# Patient Record
Sex: FEMALE | URBAN - METROPOLITAN AREA
[De-identification: names, ages, dates, MRNs, and addresses within clinical notes are randomized per-mention and may not be internally consistent; named-entity substitution may affect disease eponyms.]

---

## 2022-10-06 ENCOUNTER — INITIAL CONSULT (OUTPATIENT)
Dept: SURGERY | Age: 82
End: 2022-10-06
Payer: MEDICARE

## 2022-10-06 VITALS
SYSTOLIC BLOOD PRESSURE: 119 MMHG | DIASTOLIC BLOOD PRESSURE: 63 MMHG | HEIGHT: 62 IN | WEIGHT: 154.2 LBS | BODY MASS INDEX: 28.37 KG/M2 | HEART RATE: 60 BPM

## 2022-10-06 DIAGNOSIS — K44.9 HIATAL HERNIA: Primary | ICD-10-CM

## 2022-10-06 PROCEDURE — 1036F TOBACCO NON-USER: CPT | Performed by: SURGERY

## 2022-10-06 PROCEDURE — 99203 OFFICE O/P NEW LOW 30 MIN: CPT | Performed by: SURGERY

## 2022-10-06 PROCEDURE — G8400 PT W/DXA NO RESULTS DOC: HCPCS | Performed by: SURGERY

## 2022-10-06 PROCEDURE — 1090F PRES/ABSN URINE INCON ASSESS: CPT | Performed by: SURGERY

## 2022-10-06 PROCEDURE — G8427 DOCREV CUR MEDS BY ELIG CLIN: HCPCS | Performed by: SURGERY

## 2022-10-06 PROCEDURE — G8484 FLU IMMUNIZE NO ADMIN: HCPCS | Performed by: SURGERY

## 2022-10-06 PROCEDURE — G8419 CALC BMI OUT NRM PARAM NOF/U: HCPCS | Performed by: SURGERY

## 2022-10-06 PROCEDURE — 1123F ACP DISCUSS/DSCN MKR DOCD: CPT | Performed by: SURGERY

## 2022-10-06 RX ORDER — CALCIUM CARBONATE 500(1250)
500 TABLET ORAL DAILY
COMMUNITY

## 2022-10-06 RX ORDER — FLUTICASONE PROPIONATE 50 MCG
SPRAY, SUSPENSION (ML) NASAL
COMMUNITY
Start: 2022-09-15

## 2022-10-06 RX ORDER — SALMON OIL/OMEGA-3 FATTY ACIDS 1000-200MG
CAPSULE ORAL
COMMUNITY

## 2022-10-06 RX ORDER — ESOMEPRAZOLE MAGNESIUM 40 MG/1
CAPSULE, DELAYED RELEASE ORAL
COMMUNITY
Start: 2022-09-26

## 2022-10-06 RX ORDER — METOPROLOL SUCCINATE 50 MG/1
TABLET, EXTENDED RELEASE ORAL
COMMUNITY
Start: 2022-09-26

## 2022-10-06 RX ORDER — DILTIAZEM HYDROCHLORIDE 120 MG/1
CAPSULE, COATED, EXTENDED RELEASE ORAL
COMMUNITY
Start: 2022-09-07

## 2022-10-06 RX ORDER — ALPRAZOLAM 0.25 MG/1
TABLET ORAL
COMMUNITY
Start: 2022-09-26

## 2022-10-06 RX ORDER — MELOXICAM 7.5 MG/1
TABLET ORAL
COMMUNITY
Start: 2022-07-06

## 2022-10-06 RX ORDER — OLOPATADINE HYDROCHLORIDE 1 MG/ML
1 SOLUTION/ DROPS OPHTHALMIC 2 TIMES DAILY
COMMUNITY

## 2022-10-06 RX ORDER — DENOSUMAB 60 MG/ML
INJECTION SUBCUTANEOUS
COMMUNITY
Start: 2022-10-03

## 2022-10-06 NOTE — PROGRESS NOTES
New Patient Via Jostin Healy MD    [address], [address] [facility]  [place], [facility]  [phone]    Radha Baptist   YOB: 1940    Date of Visit:  10/6/2022    Fanta Novoa MD    Chief Complaint: Nausea and vomiting    HPI: Patient presents for evaluation of a hiatal hernia. She states about a month ago she developed nausea and vomiting. She was seen in the emergency room and diagnosed with a urinary tract infection. On her chest x-ray they noted she had a hiatal hernia. She states that she does take Nexium which seems to control her symptoms fairly well. She has occasional reflux and has had to have an esophageal dilation in the past.  The reflux is exacerbated by spicy foods.   She has not had any recent imaging or endoscopy to evaluate this    Allergies   Allergen Reactions    Sulfa Antibiotics      Outpatient Medications Marked as Taking for the 10/6/22 encounter (Initial consult) with Gurmeet Delong MD   Medication Sig Dispense Refill    fluticasone (FLONASE) 50 MCG/ACT nasal spray       ALPRAZolam (XANAX) 0.25 MG tablet       metoprolol succinate (TOPROL XL) 50 MG extended release tablet       esomeprazole (NEXIUM) 40 MG delayed release capsule       meloxicam (MOBIC) 7.5 MG tablet       dilTIAZem (CARDIZEM CD) 120 MG extended release capsule       PROLIA 60 MG/ML SOSY SC injection       Polyethyl Glycol-Propyl Glycol (SYSTANE OP) Apply to eye      olopatadine (PATANOL) 0.1 % ophthalmic solution 1 drop 2 times daily      Calcium Carb-Cholecalciferol (CALCIUM 600+D3 PO) Take by mouth      calcium carbonate (OSCAL) 500 MG TABS tablet Take 500 mg by mouth daily      VITAMIN E PO Take by mouth      Ferrous Gluconate (IRON 27 PO) Take by mouth      Omega-3 Fatty Acids (SALMON OIL-1000) 200 MG CAPS Take by mouth      ACETAMINOPHEN PO Take by mouth      Multiple Vitamins-Minerals (PRESERVISION AREDS PO) Take by mouth         No past medical history on file. Past Surgical History:   Procedure Laterality Date    BACK SURGERY      CATARACT EXTRACTION, BILATERAL      COLON SURGERY      PACEMAKER INSERTION       No family history on file. Social History     Socioeconomic History    Marital status: Unknown     Spouse name: Not on file    Number of children: Not on file    Years of education: Not on file    Highest education level: Not on file   Occupational History    Not on file   Tobacco Use    Smoking status: Never    Smokeless tobacco: Never   Vaping Use    Vaping Use: Never used   Substance and Sexual Activity    Alcohol use: Never    Drug use: Never    Sexual activity: Not on file   Other Topics Concern    Not on file   Social History Narrative    Not on file     Social Determinants of Health     Financial Resource Strain: Not on file   Food Insecurity: Not on file   Transportation Needs: Not on file   Physical Activity: Not on file   Stress: Not on file   Social Connections: Not on file   Intimate Partner Violence: Not on file   Housing Stability: Not on file          Vitals:    10/06/22 0941   BP: 119/63   Site: Left Wrist   Position: Sitting   Cuff Size: Medium Adult   Pulse: 60   Weight: 154 lb 3.2 oz (69.9 kg)   Height: 5' 2\" (1.575 m)     Body mass index is 28.2 kg/m². Wt Readings from Last 3 Encounters:   10/06/22 154 lb 3.2 oz (69.9 kg)     BP Readings from Last 3 Encounters:   10/06/22 119/63        REVIEW OF SYSTEMS:  CONSTITUTIONAL:  negative  HEENT:  Negative  RESPIRATORY:  negative  CARDIOVASCULAR:  negative  GASTROINTESTINAL:  positive for nausea, vomiting, and reflux  GENITOURINARY:  negative  HEMATOLOGIC/LYMPHATIC:  negative  ENDOCRINE:  Negative  NEUROLOGICAL:  Negative  * All other ROS reviewed and negative.      PE:  Constitutional:  Well developed, well nourished, no acute distress, non-toxic appearance   Eyes:  PERRL, conjunctiva normal   HENT:  Atraumatic, external ears normal, nose normal. Neck- normal range of motion, no tenderness, supple   Respiratory:  No respiratory distress, normal breath sounds, no rales, no wheezing   Cardiovascular:  Normal rate, normal rhythm  GI: Bowel sounds positive, soft, nontender  :  No costovertebral angle tenderness   Integument:  Well hydrated, no rash   Lymphatic:  No lymphadenopathy noted   Neurologic:  Alert & oriented x 3, no focal deficits noted   Psychiatric:  Speech and behavior appropriate       DATA:  Radiology Review: Chest x-ray shows a density in the retrocardiac area consistent with a possible hiatal hernia      Assessment:  1. Hiatal hernia        Plan: I reinforced to her that nausea and vomiting are not usually prominent symptoms of hiatal hernia, but her reflux symptoms can be. We talked about some management options and in addition I ordered an upper GI for more definitive evaluation. If this shows a significant hiatal hernia with reflux she will need additional work-up prior to considering surgical intervention.   I will give her a call with the results

## 2022-11-04 ENCOUNTER — TELEPHONE (OUTPATIENT)
Dept: SURGERY | Age: 82
End: 2022-11-04

## 2022-11-04 NOTE — TELEPHONE ENCOUNTER
Patient was seen 10/6/2022 and Dr. Dania Weston was going to order a Upper GI and the patient wants to do this at [facility]. Because its closer for her & the family. The orders have not been received by Jessica mendoza, and the patient's daughter in law stated that her sx were flaring up again. Can you please refax this order or call central scheduling? Then call the daughter in law back.

## 2022-11-05 ENCOUNTER — APPOINTMENT (OUTPATIENT)
Dept: CT IMAGING | Age: 82
DRG: 690 | End: 2022-11-05
Payer: MEDICARE

## 2022-11-05 ENCOUNTER — HOSPITAL ENCOUNTER (INPATIENT)
Age: 82
LOS: 3 days | Discharge: HOME | DRG: 690 | End: 2022-11-08
Attending: STUDENT IN AN ORGANIZED HEALTH CARE EDUCATION/TRAINING PROGRAM | Admitting: INTERNAL MEDICINE
Payer: MEDICARE

## 2022-11-05 DIAGNOSIS — R11.2 NAUSEA AND VOMITING, UNSPECIFIED VOMITING TYPE: ICD-10-CM

## 2022-11-05 DIAGNOSIS — N12 PYELONEPHRITIS: ICD-10-CM

## 2022-11-05 DIAGNOSIS — N17.9 AKI (ACUTE KIDNEY INJURY) (HCC): Primary | ICD-10-CM

## 2022-11-05 DIAGNOSIS — R65.10 SIRS (SYSTEMIC INFLAMMATORY RESPONSE SYNDROME) (HCC): ICD-10-CM

## 2022-11-05 PROBLEM — N39.0 UTI (URINARY TRACT INFECTION): Status: ACTIVE | Noted: 2022-11-05

## 2022-11-05 LAB
A/G RATIO: 0.8 (ref 1.1–2.2)
ALBUMIN SERPL-MCNC: 3 G/DL (ref 3.4–5)
ALP BLD-CCNC: 114 U/L (ref 40–129)
ALT SERPL-CCNC: 17 U/L (ref 10–40)
ANION GAP SERPL CALCULATED.3IONS-SCNC: 13 MMOL/L (ref 3–16)
AST SERPL-CCNC: 15 U/L (ref 15–37)
BACTERIA: ABNORMAL /HPF
BASOPHILS ABSOLUTE: 0 K/UL (ref 0–0.2)
BASOPHILS RELATIVE PERCENT: 0.2 %
BILIRUB SERPL-MCNC: 0.4 MG/DL (ref 0–1)
BILIRUBIN URINE: ABNORMAL
BLOOD, URINE: ABNORMAL
BUN BLDV-MCNC: 31 MG/DL (ref 7–20)
CALCIUM SERPL-MCNC: 8.8 MG/DL (ref 8.3–10.6)
CHLORIDE BLD-SCNC: 95 MMOL/L (ref 99–110)
CLARITY: ABNORMAL
CO2: 20 MMOL/L (ref 21–32)
COLOR: YELLOW
CREAT SERPL-MCNC: 1.5 MG/DL (ref 0.6–1.2)
EOSINOPHILS ABSOLUTE: 0 K/UL (ref 0–0.6)
EOSINOPHILS RELATIVE PERCENT: 0.1 %
EPITHELIAL CELLS, UA: ABNORMAL /HPF (ref 0–5)
GFR SERPL CREATININE-BSD FRML MDRD: 35 ML/MIN/{1.73_M2}
GLUCOSE BLD-MCNC: 104 MG/DL (ref 70–99)
GLUCOSE URINE: NEGATIVE MG/DL
HCT VFR BLD CALC: 30.8 % (ref 36–48)
HEMOGLOBIN: 10.3 G/DL (ref 12–16)
INFLUENZA A: NOT DETECTED
INFLUENZA B: NOT DETECTED
KETONES, URINE: 40 MG/DL
LACTIC ACID: 0.9 MMOL/L (ref 0.4–2)
LACTIC ACID: 0.9 MMOL/L (ref 0.4–2)
LEUKOCYTE ESTERASE, URINE: ABNORMAL
LIPASE: 27 U/L (ref 13–60)
LYMPHOCYTES ABSOLUTE: 0.9 K/UL (ref 1–5.1)
LYMPHOCYTES RELATIVE PERCENT: 5.5 %
MCH RBC QN AUTO: 31.4 PG (ref 26–34)
MCHC RBC AUTO-ENTMCNC: 33.4 G/DL (ref 31–36)
MCV RBC AUTO: 94.1 FL (ref 80–100)
MICROSCOPIC EXAMINATION: YES
MONOCYTES ABSOLUTE: 1.3 K/UL (ref 0–1.3)
MONOCYTES RELATIVE PERCENT: 8 %
NEUTROPHILS ABSOLUTE: 14 K/UL (ref 1.7–7.7)
NEUTROPHILS RELATIVE PERCENT: 86.2 %
NITRITE, URINE: NEGATIVE
PDW BLD-RTO: 13.5 % (ref 12.4–15.4)
PH UA: 5.5 (ref 5–8)
PLATELET # BLD: 259 K/UL (ref 135–450)
PMV BLD AUTO: 6.9 FL (ref 5–10.5)
POTASSIUM REFLEX MAGNESIUM: 4 MMOL/L (ref 3.5–5.1)
PROTEIN UA: 100 MG/DL
RBC # BLD: 3.27 M/UL (ref 4–5.2)
RBC UA: ABNORMAL /HPF (ref 0–4)
RENAL EPITHELIAL, UA: ABNORMAL /HPF (ref 0–1)
SARS-COV-2 RNA, RT PCR: NOT DETECTED
SODIUM BLD-SCNC: 128 MMOL/L (ref 136–145)
SPECIFIC GRAVITY UA: >=1.03 (ref 1–1.03)
TOTAL PROTEIN: 6.8 G/DL (ref 6.4–8.2)
TROPONIN: <0.01 NG/ML
URINE REFLEX TO CULTURE: YES
URINE TYPE: ABNORMAL
UROBILINOGEN, URINE: 0.2 E.U./DL
WBC # BLD: 16.2 K/UL (ref 4–11)
WBC UA: ABNORMAL /HPF (ref 0–5)

## 2022-11-05 PROCEDURE — 84484 ASSAY OF TROPONIN QUANT: CPT

## 2022-11-05 PROCEDURE — 87186 SC STD MICRODIL/AGAR DIL: CPT

## 2022-11-05 PROCEDURE — 6360000002 HC RX W HCPCS: Performed by: INTERNAL MEDICINE

## 2022-11-05 PROCEDURE — 96365 THER/PROPH/DIAG IV INF INIT: CPT

## 2022-11-05 PROCEDURE — 99285 EMERGENCY DEPT VISIT HI MDM: CPT

## 2022-11-05 PROCEDURE — 83690 ASSAY OF LIPASE: CPT

## 2022-11-05 PROCEDURE — 2580000003 HC RX 258: Performed by: PHYSICIAN ASSISTANT

## 2022-11-05 PROCEDURE — 96375 TX/PRO/DX INJ NEW DRUG ADDON: CPT

## 2022-11-05 PROCEDURE — 74176 CT ABD & PELVIS W/O CONTRAST: CPT

## 2022-11-05 PROCEDURE — 87086 URINE CULTURE/COLONY COUNT: CPT

## 2022-11-05 PROCEDURE — 2500000003 HC RX 250 WO HCPCS: Performed by: INTERNAL MEDICINE

## 2022-11-05 PROCEDURE — 87088 URINE BACTERIA CULTURE: CPT

## 2022-11-05 PROCEDURE — 1200000000 HC SEMI PRIVATE

## 2022-11-05 PROCEDURE — 93005 ELECTROCARDIOGRAM TRACING: CPT | Performed by: PHYSICIAN ASSISTANT

## 2022-11-05 PROCEDURE — 81001 URINALYSIS AUTO W/SCOPE: CPT

## 2022-11-05 PROCEDURE — 87636 SARSCOV2 & INF A&B AMP PRB: CPT

## 2022-11-05 PROCEDURE — 6360000002 HC RX W HCPCS: Performed by: STUDENT IN AN ORGANIZED HEALTH CARE EDUCATION/TRAINING PROGRAM

## 2022-11-05 PROCEDURE — 96361 HYDRATE IV INFUSION ADD-ON: CPT

## 2022-11-05 PROCEDURE — 6370000000 HC RX 637 (ALT 250 FOR IP): Performed by: INTERNAL MEDICINE

## 2022-11-05 PROCEDURE — 85025 COMPLETE CBC W/AUTO DIFF WBC: CPT

## 2022-11-05 PROCEDURE — 80053 COMPREHEN METABOLIC PANEL: CPT

## 2022-11-05 PROCEDURE — 6360000002 HC RX W HCPCS: Performed by: PHYSICIAN ASSISTANT

## 2022-11-05 PROCEDURE — 83605 ASSAY OF LACTIC ACID: CPT

## 2022-11-05 PROCEDURE — 87040 BLOOD CULTURE FOR BACTERIA: CPT

## 2022-11-05 PROCEDURE — 36415 COLL VENOUS BLD VENIPUNCTURE: CPT

## 2022-11-05 RX ORDER — KETOTIFEN FUMARATE 0.35 MG/ML
1 SOLUTION/ DROPS OPHTHALMIC 2 TIMES DAILY
Status: DISCONTINUED | OUTPATIENT
Start: 2022-11-05 | End: 2022-11-08 | Stop reason: HOSPADM

## 2022-11-05 RX ORDER — POLYETHYLENE GLYCOL 3350 17 G/17G
17 POWDER, FOR SOLUTION ORAL DAILY PRN
Status: DISCONTINUED | OUTPATIENT
Start: 2022-11-05 | End: 2022-11-08 | Stop reason: HOSPADM

## 2022-11-05 RX ORDER — 0.9 % SODIUM CHLORIDE 0.9 %
1000 INTRAVENOUS SOLUTION INTRAVENOUS ONCE
Status: COMPLETED | OUTPATIENT
Start: 2022-11-05 | End: 2022-11-05

## 2022-11-05 RX ORDER — SODIUM CHLORIDE 0.9 % (FLUSH) 0.9 %
5-40 SYRINGE (ML) INJECTION PRN
Status: DISCONTINUED | OUTPATIENT
Start: 2022-11-05 | End: 2022-11-08 | Stop reason: HOSPADM

## 2022-11-05 RX ORDER — DILTIAZEM HYDROCHLORIDE 120 MG/1
120 CAPSULE, EXTENDED RELEASE ORAL DAILY
Status: DISCONTINUED | OUTPATIENT
Start: 2022-11-05 | End: 2022-11-07

## 2022-11-05 RX ORDER — METOPROLOL SUCCINATE 50 MG/1
50 TABLET, EXTENDED RELEASE ORAL DAILY
Status: DISCONTINUED | OUTPATIENT
Start: 2022-11-05 | End: 2022-11-07

## 2022-11-05 RX ORDER — ENOXAPARIN SODIUM 100 MG/ML
30 INJECTION SUBCUTANEOUS DAILY
Status: DISCONTINUED | OUTPATIENT
Start: 2022-11-05 | End: 2022-11-08 | Stop reason: HOSPADM

## 2022-11-05 RX ORDER — SODIUM CHLORIDE 9 MG/ML
INJECTION, SOLUTION INTRAVENOUS PRN
Status: DISCONTINUED | OUTPATIENT
Start: 2022-11-05 | End: 2022-11-08 | Stop reason: HOSPADM

## 2022-11-05 RX ORDER — ONDANSETRON 4 MG/1
4 TABLET, ORALLY DISINTEGRATING ORAL EVERY 8 HOURS PRN
Status: DISCONTINUED | OUTPATIENT
Start: 2022-11-05 | End: 2022-11-08 | Stop reason: HOSPADM

## 2022-11-05 RX ORDER — MORPHINE SULFATE 2 MG/ML
2 INJECTION, SOLUTION INTRAMUSCULAR; INTRAVENOUS EVERY 4 HOURS PRN
Status: DISCONTINUED | OUTPATIENT
Start: 2022-11-05 | End: 2022-11-08 | Stop reason: HOSPADM

## 2022-11-05 RX ORDER — ONDANSETRON 2 MG/ML
4 INJECTION INTRAMUSCULAR; INTRAVENOUS EVERY 6 HOURS PRN
Status: DISCONTINUED | OUTPATIENT
Start: 2022-11-05 | End: 2022-11-08 | Stop reason: HOSPADM

## 2022-11-05 RX ORDER — ACETAMINOPHEN 650 MG/1
650 SUPPOSITORY RECTAL EVERY 6 HOURS PRN
Status: DISCONTINUED | OUTPATIENT
Start: 2022-11-05 | End: 2022-11-08 | Stop reason: HOSPADM

## 2022-11-05 RX ORDER — PANTOPRAZOLE SODIUM 40 MG/1
40 TABLET, DELAYED RELEASE ORAL
Status: DISCONTINUED | OUTPATIENT
Start: 2022-11-05 | End: 2022-11-08 | Stop reason: HOSPADM

## 2022-11-05 RX ORDER — 0.9 % SODIUM CHLORIDE 0.9 %
1100 INTRAVENOUS SOLUTION INTRAVENOUS ONCE
Status: COMPLETED | OUTPATIENT
Start: 2022-11-05 | End: 2022-11-05

## 2022-11-05 RX ORDER — ACETAMINOPHEN 325 MG/1
650 TABLET ORAL EVERY 6 HOURS PRN
Status: DISCONTINUED | OUTPATIENT
Start: 2022-11-05 | End: 2022-11-08 | Stop reason: HOSPADM

## 2022-11-05 RX ORDER — ONDANSETRON 2 MG/ML
4 INJECTION INTRAMUSCULAR; INTRAVENOUS ONCE
Status: COMPLETED | OUTPATIENT
Start: 2022-11-05 | End: 2022-11-05

## 2022-11-05 RX ORDER — MORPHINE SULFATE 2 MG/ML
2 INJECTION, SOLUTION INTRAMUSCULAR; INTRAVENOUS ONCE
Status: COMPLETED | OUTPATIENT
Start: 2022-11-05 | End: 2022-11-05

## 2022-11-05 RX ORDER — SODIUM CHLORIDE 0.9 % (FLUSH) 0.9 %
5-40 SYRINGE (ML) INJECTION EVERY 12 HOURS SCHEDULED
Status: DISCONTINUED | OUTPATIENT
Start: 2022-11-05 | End: 2022-11-08 | Stop reason: HOSPADM

## 2022-11-05 RX ORDER — DEXTROSE, SODIUM CHLORIDE, AND POTASSIUM CHLORIDE 5; .9; .15 G/100ML; G/100ML; G/100ML
INJECTION INTRAVENOUS CONTINUOUS
Status: DISCONTINUED | OUTPATIENT
Start: 2022-11-05 | End: 2022-11-07

## 2022-11-05 RX ADMIN — ENOXAPARIN SODIUM 30 MG: 100 INJECTION SUBCUTANEOUS at 19:50

## 2022-11-05 RX ADMIN — SODIUM CHLORIDE 1000 ML: 9 INJECTION, SOLUTION INTRAVENOUS at 11:49

## 2022-11-05 RX ADMIN — KETOTIFEN FUMARATE 1 DROP: 0.35 SOLUTION/ DROPS OPHTHALMIC at 22:23

## 2022-11-05 RX ADMIN — CEFTRIAXONE SODIUM 1000 MG: 1 INJECTION, POWDER, FOR SOLUTION INTRAMUSCULAR; INTRAVENOUS at 16:39

## 2022-11-05 RX ADMIN — DEXTROSE, SODIUM CHLORIDE, AND POTASSIUM CHLORIDE: 5; .9; .15 INJECTION INTRAVENOUS at 19:51

## 2022-11-05 RX ADMIN — HYDROMORPHONE HYDROCHLORIDE 0.5 MG: 1 INJECTION, SOLUTION INTRAMUSCULAR; INTRAVENOUS; SUBCUTANEOUS at 16:41

## 2022-11-05 RX ADMIN — SODIUM CHLORIDE 1100 ML: 9 INJECTION, SOLUTION INTRAVENOUS at 16:37

## 2022-11-05 RX ADMIN — MORPHINE SULFATE 2 MG: 2 INJECTION, SOLUTION INTRAMUSCULAR; INTRAVENOUS at 11:50

## 2022-11-05 RX ADMIN — ONDANSETRON HYDROCHLORIDE 4 MG: 2 INJECTION, SOLUTION INTRAMUSCULAR; INTRAVENOUS at 11:49

## 2022-11-05 RX ADMIN — PANTOPRAZOLE SODIUM 40 MG: 40 TABLET, DELAYED RELEASE ORAL at 19:50

## 2022-11-05 RX ADMIN — METOPROLOL SUCCINATE 50 MG: 50 TABLET, EXTENDED RELEASE ORAL at 22:24

## 2022-11-05 ASSESSMENT — PAIN SCALES - GENERAL
PAINLEVEL_OUTOF10: 7
PAINLEVEL_OUTOF10: 3

## 2022-11-05 ASSESSMENT — PAIN DESCRIPTION - LOCATION: LOCATION: ABDOMEN

## 2022-11-05 ASSESSMENT — PAIN DESCRIPTION - ORIENTATION: ORIENTATION: LEFT

## 2022-11-05 ASSESSMENT — PAIN - FUNCTIONAL ASSESSMENT: PAIN_FUNCTIONAL_ASSESSMENT: 0-10

## 2022-11-05 NOTE — ED PROVIDER NOTES
I independently examined and evaluated Alfonso Morel. I personally saw the patient and performed a substantive portion of the visit including all aspects of the medical decision making. In brief, this 51-year-old female is presenting with left-sided flank pain for the last 4 days. Endorses significant nausea and vomiting, all nonbloody nonbilious. Denies any fevers or chills. Has not noticed dysuria. She is concerned this is related to a hernia as she was diagnosed with over a month ago. Focused exam revealed   General: Alert, no acute distress, patient resting comfortably   Skin: warm, intact, no pallor noted   Head: Normocephalic, atraumatic   Eye: Normal conjunctiva   Cardiac: Normal peripheral perfusion  Respiratory: No acute distress   Back: Left CVA tenderness  Abdominal: Left flank and left lower quadrant tenderness to palpation without rebound or rigidity. Musculoskeletal: No deformity, full ROM. Neurological: alert and oriented, normal sensory and motor observed. Psychiatric: Cooperative    ED course: Lab work obtained and is concerning for what I suspect is a mild NIK with a creatinine 1.5 and a moderate hyponatremia of 128. No old labs to compare to. She does have a leukocytosis of 16.2. Urine is concerning for infection. Treat with ceftriaxone and IV fluid. Morphine for pain with mild improvement and then redosed with hydromorphone. CT scan shows bilateral stranding around the kidneys concerning for possible pyelonephritis. This in conjunction with the patient's leukocytosis and symptoms warrants admission for IV antibiotics and further evaluation. All diagnostic, treatment, and disposition decisions were made by myself in conjunction with the advanced practice provider. For all further details of the patient's emergency department visit, please see the advanced practice provider's documentation.     Comment: Please note this report has been produced using speech recognition software and may contain errors related to that system including errors in grammar, punctuation, and spelling, as well as words and phrases that may be inappropriate. If there are any questions or concerns please feel free to contact the dictating provider for clarification.         Kadie Cai,   11/05/22 6893

## 2022-11-05 NOTE — ED PROVIDER NOTES
The Ekg interpreted by me shows  atrial fibrillation with a rate of 96, PVCs  Axis is   Left axis deviation  QTc is  normal  Intervals and Durations are unremarkable.       ST Segments: nonspecific changes, nonspecific T wave abnormalities  No previous available for comparison         Meggan Magaña MD  11/05/22 4369

## 2022-11-05 NOTE — ED PROVIDER NOTES
[address] ED  EMERGENCY DEPARTMENT ENCOUNTER        Pt Name: Adi Whiting  MRN: [id number]  [place] 1940  Date of evaluation: 11/5/2022  Provider: MATTHIAS Victoria  PCP: Donte Perry MD    This patient was seen and evaluated by the attending physician Jasmyne Haynes, Ying Garay       Chief Complaint   Patient presents with    Abdominal Pain     Emesis and pain into the LLQ and into the hip since Tuesday. Was seen at [facility] in the last 4-5 weeks and was diagnosed with a hernia, was supposed to see Dr. Phil Laguna and hasn't yet. Emesis       HISTORY OF PRESENT ILLNESS   (Location/Symptom, Timing/Onset, Context/Setting, Quality, Duration, Modifying Factors, Severity)  Note limiting factors. Adi Whiting is a 80 y.o. female with past medical history of atrial fibrillation, hypertension, cardiomyopathy, who presents via private vehicle from her home with her son for evaluation of abdominal pain and vomiting. Patient notes that she has been having entire left side pain for the past several days that started around Tuesday. Around the same time she started with nausea vomiting. She notes that she has vomited approximately 3-5 times daily. She is also been having loose more watery stools. She denies any fevers. Notes that over the last couple days she has not had anything to eat or drink for fear of nausea and vomiting. She not had any medicine for her symptoms. She denies any urinary symptoms but notes that her urine is dark. She denies any chest pain cough shortness of breath, no sore throat no headache no runny nose nasal congestion. She notes that she has been diagnosed with an upper abdominal hernia and was supposed to have an EGD but is having a hard time getting the order completed. Nursing Notes were all reviewed and agreed with or any disagreements were addressed  in the HPI. Pt was seen during the [place] 19 pandemic.  Appropriate PPE worn by ME during patient encounters. Pt seen during a time with constrained hospital bed capacity and other potential inpatient and outpatient resources were constrained due to the viral pandemic. REVIEW OF SYSTEMS    (2-9 systems for level 4, 10 or more for level 5)     Review of Systems    Positives and Pertinent negatives as per HPI. Except as noted abovein the ROS, all other systems were reviewed and negative. PAST MEDICAL HISTORY     Past Medical History:   Diagnosis Date    Atrial fibrillation ([facility] [address]     Cardiomyopathy ([facility] [address]     Hypertension          SURGICAL HISTORY     Past Surgical History:   Procedure Laterality Date    BACK SURGERY      CATARACT EXTRACTION, BILATERAL      COLON SURGERY      PACEMAKER INSERTION           CURRENTMEDICATIONS       Previous Medications    ACETAMINOPHEN PO    Take by mouth    ALPRAZOLAM (XANAX) 0.25 MG TABLET        CALCIUM CARB-CHOLECALCIFEROL (CALCIUM 600+D3 PO)    Take by mouth    CALCIUM CARBONATE (OSCAL) 500 MG TABS TABLET    Take 500 mg by mouth daily    DILTIAZEM (CARDIZEM CD) 120 MG EXTENDED RELEASE CAPSULE        ESOMEPRAZOLE (NEXIUM) 40 MG DELAYED RELEASE CAPSULE        FERROUS GLUCONATE (IRON 27 PO)    Take by mouth    FLUTICASONE (FLONASE) 50 MCG/ACT NASAL SPRAY        MELOXICAM (MOBIC) 7.5 MG TABLET        METOPROLOL SUCCINATE (TOPROL XL) 50 MG EXTENDED RELEASE TABLET        MULTIPLE VITAMINS-MINERALS (PRESERVISION AREDS PO)    Take by mouth    OLOPATADINE (PATANOL) 0.1 % OPHTHALMIC SOLUTION    1 drop 2 times daily    OMEGA-3 FATTY ACIDS (SALMON OIL-1000) 200 MG CAPS    Take by mouth    POLYETHYL GLYCOL-PROPYL GLYCOL (SYSTANE OP)    Apply to eye    PROLIA 60 MG/ML SOSY SC INJECTION        VITAMIN E PO    Take by mouth         ALLERGIES     Sulfa antibiotics    FAMILYHISTORY     History reviewed. No pertinent family history.        SOCIAL HISTORY       Social History     Socioeconomic History    Marital status: Unknown     Spouse name: None    Number of children: None Years of education: None    Highest education level: None   Tobacco Use    Smoking status: Never    Smokeless tobacco: Never   Vaping Use    Vaping Use: Never used   Substance and Sexual Activity    Alcohol use: Never    Drug use: Never       SCREENINGS    Matt Coma Scale  Eye Opening: Spontaneous  Best Verbal Response: Oriented  Best Motor Response: Obeys commands  Matt Coma Scale Score: 15        PHYSICAL EXAM    (up to 7 for level 4, 8 or more for level 5)     ED Triage Vitals [11/05/22 1103]   BP Temp Temp Source Heart Rate Resp SpO2 Height Weight   (!) 149/96 98.2 °F (36.8 °C) Oral 97 20 97 % 5' 3\" (1.6 m) 150 lb (68 kg)       Physical Exam  Vitals and nursing note reviewed. Constitutional:       General: She is not in acute distress. Appearance: She is well-developed. She is not ill-appearing, toxic-appearing or diaphoretic. HENT:      Head: Normocephalic and atraumatic. Right Ear: External ear normal.      Left Ear: External ear normal.      Nose: Nose normal.      Mouth/Throat:      Mouth: Mucous membranes are dry. Pharynx: Oropharynx is clear. Eyes:      General:         Right eye: No discharge. Left eye: No discharge. Extraocular Movements: Extraocular movements intact. Conjunctiva/sclera: Conjunctivae normal.      Pupils: Pupils are equal, round, and reactive to light. Cardiovascular:      Rate and Rhythm: Normal rate and regular rhythm. Pulses: Normal pulses. Heart sounds: Normal heart sounds. No murmur heard. No friction rub. No gallop. Pulmonary:      Effort: Pulmonary effort is normal. No respiratory distress. Breath sounds: Normal breath sounds. No wheezing or rales. Abdominal:      General: Abdomen is flat. Bowel sounds are decreased. There is no distension. Palpations: Abdomen is soft. Tenderness: There is generalized abdominal tenderness. There is no right CVA tenderness, left CVA tenderness, guarding or rebound. Musculoskeletal:      Cervical back: Normal range of motion and neck supple. No rigidity. Lymphadenopathy:      Cervical: No cervical adenopathy. Skin:     General: Skin is warm and dry. Capillary Refill: Capillary refill takes less than 2 seconds. Coloration: Skin is not jaundiced or pale. Neurological:      General: No focal deficit present. Mental Status: She is alert and oriented to person, place, and time. Psychiatric:         Behavior: Behavior normal.       DIAGNOSTIC RESULTS   LABS:    Labs Reviewed - No data to display    All other labs were within normal range or not returned as of this dictation. EKG: All EKG's are interpreted by the Emergency Department Physician who either signs orCo-signs this chart in the absence of a cardiologist.  Please see their note for interpretation of EKG. RADIOLOGY:   Non-plain film images such as CT, Ultrasound and MRI are read by the radiologist. Plain radiographic images are visualized andpreliminarily interpreted by the  ED Provider with the below findings:        Interpretation perthe Radiologist below, if available at the time of this note:    No orders to display     No results found. PROCEDURES   Unless otherwise noted below, none     Procedures    CRITICAL CARE TIME   N/A    CONSULTS:  None      EMERGENCY DEPARTMENT COURSE and DIFFERENTIALDIAGNOSIS/MDM:   Vitals:    Vitals:    11/05/22 1103   BP: (!) 149/96   Pulse: 97   Resp: 20   Temp: 98.2 °F (36.8 °C)   TempSrc: Oral   SpO2: 97%   Weight: 150 lb (68 kg)   Height: 5' 3\" (1.6 m)       Patient was given thefollowing medications:  Medications - No data to display    PDMP Monitoring:    Last PDMP [place] as Reviewed [facility]):  Review User Review Instant Review Result            Urine Drug Screenings (1 yr)    No resulted procedures found. Medication Contract and Consent for Opioid Use Documents Filed        No documents found                    MDM:   Patient seen and evaluated. Old records reviewed. Diagnostic testing reviewed and results discussed. I have independently evaluated this patient based upon my scope of practice. Supervising physician was in the department for consultation as needed. Patient is an 80-year-old female who presents for evaluation of abdominal pain nausea vomiting. Patient was seen and evaluated by myself. Physical exam is remarkable for overall well-appearing adult female, vital signs stable initially, she does develop some tachycardia while in the department. She is not complaining of chest pain, no difficulty breathing. Her abdomen is soft there is no rigidity guarding or rebound tenderness but she is with generalized tenderness. No CVA tenderness appreciated by myself. She does appear to have dry mucous membranes, IV fluids were initiated. Patient had work-up which included blood work and imaging. CBC is remarkable for mild acute leukocytosis. Metabolic panel is concerning for mild hyponatremia and hypochloremia, BUN and creatinine are elevated, she has an NIK. Lactic is within normal limits. Lipase within normal limits. Troponin within normal limits. Urinalysis is remarkable for signs of cystitis. Blood cultures obtained patient is meeting SIRS criteria. COVID flu negative. CT abdomen pelvis without contrast was obtained which reveals moderate gallbladder distention without calcified stones or obvious findings of cholecystitis, she has no right upper quadrant abdominal pain and LFTs are within normal limits I do not believe that this is contributing to her pain. She has bilateral perinephritic stranding which I believe could be related to underlying pyelonephritis given her UTI. She has a 1.1 cm nodule opacity to the base of the lingula could be scarring granuloma or neoplasm, outpatient CT recommended. She has trace left pleural effusion and trace pericardial effusion appreciated.   Patient started on broad-spectrum antibiotics, she does not have septic shock. All information including ED workup, results, treatment, diagnosis has been reviewed and discussed with ED attending physician and directly discussed with Hospitalist who is the admitting physician. Pt will be admitted in stable condition. Pt advised of admission and is in full agreement. FINAL IMPRESSION      1. NIK (acute kidney injury) ([facility]    2. Pyelonephritis    3. SIRS (systemic inflammatory response syndrome) ([facility])    4. Nausea and vomiting, unspecified vomiting type          DISPOSITION/PLAN   DISPOSITION        PATIENT REFERREDTO:  No follow-up provider specified.     DISCHARGE MEDICATIONS:  New Prescriptions    No medications on file       DISCONTINUED MEDICATIONS:  Discontinued Medications    No medications on file              (Please note that portions ofthis note were completed with a voice recognition program.  Efforts were made to edit the dictations but occasionally words are mis-transcribed.)    Padmaja Frederick (electronically signed)        Padmaja Frederick  11/05/22 1827

## 2022-11-05 NOTE — FLOWSHEET NOTE
11/05/22 1805   Vitals   Temp 98 °F (36.7 °C)   Temp Source Oral   Heart Rate (!) 119   Heart Rate Source Monitor   Resp 16   BP (!) 152/83   BP Location Right upper arm   BP Upper/Lower Upper   BP Method Automatic   Patient Position Semi fowlers   Oxygen Therapy   SpO2 97 %   Admission: Patient received to room 205rom ED. Patient admitted with Dx of UTI Patient A&Ox 4  upon arrival.  Admission assessment as charted. VSS. Pt C/O pain 7/10 to left side in ED had dilaudid 0.5 at 1641 and morphine 2 mg 1115 ( morphine not effective ) pt is also asking for tears for dry eyes and reports needs to have her Toprol Xl 50 mg BID our order is daily . Also no PRN pain med's perfect serve  sent to MD .Patient oriented to room, staff, and call system. Educated on fall protocol and hourly rounding. Patient informed to utilize call light with any needs. Pt verbalized understanding. Will continue to monitor.

## 2022-11-05 NOTE — PROGRESS NOTES
4 Eyes Admission Assessment     I agree as the admission nurse that 2 RN's have performed a thorough Head to Toe Skin Assessment on the patient. ALL assessment sites listed below have been assessed on admission. Areas assessed by both nurses:  [x]   Head, Face, and Ears   [x]   Shoulders, Back, and Chest  [x]   Arms, Elbows, and Hands   [x]   Coccyx, Sacrum, and Ischium  [x]   Legs, Feet, and Heels      Bruises on arms legs, abrasions to left upper thigh callous to bottom R foot   Does the Patient have Skin Breakdown?   No         Rc Prevention initiated:  No   Wound Care Orders initiated:  No      [facility] nurse consulted for Pressure Injury (Stage 3,4, Unstageable, DTI, NWPT, and Complex wounds) or Rc score 18 or lower:  No      Nurse 1 eSignature: Electronically signed by Jairo Dietz RN on 11/5/22 at 6:49 PM EDT    **SHARE this note so that the co-signing nurse is able to place an eSignature**    Nurse 2 eSignature: Electronically signed by Michela Romero RN on 11/5/22 at 6:57 PM EDT

## 2022-11-05 NOTE — ED NOTES
[address] [facility] hospitalist  [phone]- Hospitalist placed admission orders     Maria Elena Adrian  11/05/22 [address]  11/05/22 6563

## 2022-11-06 PROBLEM — N17.9 AKI (ACUTE KIDNEY INJURY) (HCC): Status: ACTIVE | Noted: 2022-11-06

## 2022-11-06 PROBLEM — R11.2 NAUSEA AND VOMITING: Status: ACTIVE | Noted: 2022-11-06

## 2022-11-06 PROBLEM — N12 PYELONEPHRITIS: Status: ACTIVE | Noted: 2022-11-06

## 2022-11-06 PROBLEM — R65.10 SIRS (SYSTEMIC INFLAMMATORY RESPONSE SYNDROME) (HCC): Status: ACTIVE | Noted: 2022-11-06

## 2022-11-06 LAB
ALBUMIN SERPL-MCNC: 2.6 G/DL (ref 3.4–5)
ANION GAP SERPL CALCULATED.3IONS-SCNC: 9 MMOL/L (ref 3–16)
BASOPHILS ABSOLUTE: 0 K/UL (ref 0–0.2)
BASOPHILS RELATIVE PERCENT: 0.2 %
BUN BLDV-MCNC: 25 MG/DL (ref 7–20)
CALCIUM SERPL-MCNC: 7.5 MG/DL (ref 8.3–10.6)
CHLORIDE BLD-SCNC: 101 MMOL/L (ref 99–110)
CO2: 20 MMOL/L (ref 21–32)
CREAT SERPL-MCNC: 1.3 MG/DL (ref 0.6–1.2)
EKG ATRIAL RATE: 131 BPM
EKG DIAGNOSIS: NORMAL
EKG Q-T INTERVAL: 320 MS
EKG QRS DURATION: 94 MS
EKG QTC CALCULATION (BAZETT): 404 MS
EKG R AXIS: -23 DEGREES
EKG T AXIS: 7 DEGREES
EKG VENTRICULAR RATE: 96 BPM
EOSINOPHILS ABSOLUTE: 0.1 K/UL (ref 0–0.6)
EOSINOPHILS RELATIVE PERCENT: 0.5 %
GFR SERPL CREATININE-BSD FRML MDRD: 41 ML/MIN/{1.73_M2}
GLUCOSE BLD-MCNC: 123 MG/DL (ref 70–99)
HCT VFR BLD CALC: 25.8 % (ref 36–48)
HEMOGLOBIN: 8.6 G/DL (ref 12–16)
LACTIC ACID: 0.9 MMOL/L (ref 0.4–2)
LYMPHOCYTES ABSOLUTE: 0.8 K/UL (ref 1–5.1)
LYMPHOCYTES RELATIVE PERCENT: 6.5 %
MCH RBC QN AUTO: 31.5 PG (ref 26–34)
MCHC RBC AUTO-ENTMCNC: 33.4 G/DL (ref 31–36)
MCV RBC AUTO: 94.4 FL (ref 80–100)
MONOCYTES ABSOLUTE: 1.2 K/UL (ref 0–1.3)
MONOCYTES RELATIVE PERCENT: 10.6 %
NEUTROPHILS ABSOLUTE: 9.5 K/UL (ref 1.7–7.7)
NEUTROPHILS RELATIVE PERCENT: 82.2 %
PDW BLD-RTO: 13.4 % (ref 12.4–15.4)
PHOSPHORUS: 1.4 MG/DL (ref 2.5–4.9)
PLATELET # BLD: 232 K/UL (ref 135–450)
PMV BLD AUTO: 6.3 FL (ref 5–10.5)
POTASSIUM SERPL-SCNC: 4.1 MMOL/L (ref 3.5–5.1)
RBC # BLD: 2.73 M/UL (ref 4–5.2)
SODIUM BLD-SCNC: 130 MMOL/L (ref 136–145)
WBC # BLD: 11.5 K/UL (ref 4–11)

## 2022-11-06 PROCEDURE — 99232 SBSQ HOSP IP/OBS MODERATE 35: CPT | Performed by: INTERNAL MEDICINE

## 2022-11-06 PROCEDURE — 1200000000 HC SEMI PRIVATE

## 2022-11-06 PROCEDURE — 6370000000 HC RX 637 (ALT 250 FOR IP): Performed by: INTERNAL MEDICINE

## 2022-11-06 PROCEDURE — 93010 ELECTROCARDIOGRAM REPORT: CPT | Performed by: INTERNAL MEDICINE

## 2022-11-06 PROCEDURE — 6360000002 HC RX W HCPCS: Performed by: HOSPITALIST

## 2022-11-06 PROCEDURE — 83605 ASSAY OF LACTIC ACID: CPT

## 2022-11-06 PROCEDURE — 36415 COLL VENOUS BLD VENIPUNCTURE: CPT

## 2022-11-06 PROCEDURE — 85025 COMPLETE CBC W/AUTO DIFF WBC: CPT

## 2022-11-06 PROCEDURE — 6360000002 HC RX W HCPCS: Performed by: INTERNAL MEDICINE

## 2022-11-06 PROCEDURE — 2580000003 HC RX 258: Performed by: INTERNAL MEDICINE

## 2022-11-06 PROCEDURE — 80069 RENAL FUNCTION PANEL: CPT

## 2022-11-06 RX ADMIN — MORPHINE SULFATE 2 MG: 2 INJECTION, SOLUTION INTRAMUSCULAR; INTRAVENOUS at 02:10

## 2022-11-06 RX ADMIN — KETOTIFEN FUMARATE 1 DROP: 0.35 SOLUTION/ DROPS OPHTHALMIC at 21:57

## 2022-11-06 RX ADMIN — METOPROLOL SUCCINATE 50 MG: 50 TABLET, EXTENDED RELEASE ORAL at 08:46

## 2022-11-06 RX ADMIN — ONDANSETRON 4 MG: 4 TABLET, ORALLY DISINTEGRATING ORAL at 22:06

## 2022-11-06 RX ADMIN — PANTOPRAZOLE SODIUM 40 MG: 40 TABLET, DELAYED RELEASE ORAL at 05:26

## 2022-11-06 RX ADMIN — KETOTIFEN FUMARATE 1 DROP: 0.35 SOLUTION/ DROPS OPHTHALMIC at 08:46

## 2022-11-06 RX ADMIN — DILTIAZEM HYDROCHLORIDE 120 MG: 120 CAPSULE, EXTENDED RELEASE ORAL at 08:46

## 2022-11-06 RX ADMIN — PANTOPRAZOLE SODIUM 40 MG: 40 TABLET, DELAYED RELEASE ORAL at 16:33

## 2022-11-06 RX ADMIN — ENOXAPARIN SODIUM 30 MG: 100 INJECTION SUBCUTANEOUS at 08:46

## 2022-11-06 RX ADMIN — CEFTRIAXONE SODIUM 1000 MG: 1 INJECTION, POWDER, FOR SOLUTION INTRAMUSCULAR; INTRAVENOUS at 16:35

## 2022-11-06 ASSESSMENT — PAIN SCALES - GENERAL
PAINLEVEL_OUTOF10: 8
PAINLEVEL_OUTOF10: 5

## 2022-11-06 NOTE — FLOWSHEET NOTE
11/05/22 2218   Vital Signs   Temp 96.9 °F (36.1 °C)   Temp Source Oral   Heart Rate 98   Heart Rate Source Monitor   Resp 18   BP (!) 148/79   BP Location Left upper arm   BP Method Automatic   MAP (Calculated) 102   Patient Position High fowlers   Level of Consciousness 0   MEWS Score 1   Pain Assessment   Pain Assessment 0-10   Pain Level 3   Oxygen Therapy   SpO2 95 %   O2 Device None (Room air)   HS  assessment completed, see flow sheet. Pt is alert and oriented. BP is elevated, no s/s of distress noted. Respirations are even & easy. Complaints of mild left flank pain voiced, pt declined prn pain medication at this time. Pt denies needs at this time. SR up x 2, and bed in low position. Call light is within reach. Bedside Mobility Assessment Tool (BMAT):     Assessment Level 1- Sit and Shake    1. From a semi-reclined position, ask patient to sit up and rotate to a seated position at the side of the bed. Can use the bedrail. 2. Ask patient to reach out and grab your hand and shake making sure patient reaches across his/her midline. Pass- Patient is able to come to a seated position, maintain core strength. Maintains seated balance while reaching across midline. Move on to Assessment Level 2. Assessment Level 2- Stretch and Point   1. With patient in seated position at the side of the bed, have patient place both feet on the floor (or stool) with knees no higher than hips. 2. Ask patient to stretch one leg and straighten the knee, then bend the ankle/flex and point the toes. If appropriate, repeat with the other leg. Pass- Patient is able to demonstrate appropriate quad strength on intended weight bearing limb(s). Move onto Assessment Level 3. Assessment Level 3- Stand   1. Ask patient to elevate off the bed or chair (seated to standing) using an assistive device (cane, bedrail). 2. Patient should be able to raise buttocks off be and hold for a count of five. May repeat once.    Pass- Patient maintains standing stability for at least 5 seconds, proceed to assessment level 4. Assessment Level 4- Walk   1. Ask patient to march in place at bedside. 2. Then ask patient to advance step and return each foot. Some medical conditions may render a patient from stepping backwards, use your best clinical judgement. Pass- Patient demonstrates balance while shifting weight and ability to step, takes independent steps, does not use assistive device patient is MOBILITY LEVEL 4. Mobility Level- 4    Patient is not able to demonstrate the ability to move from a reclining position to an upright position within the recliner due to weakness .

## 2022-11-06 NOTE — H&P
Hospital Medicine History & Physical      PCP: Edumnd Shelton MD    Date of Admission: 11/5/2022    Date of Service: Pt seen/examined on 11/5/22 and Admitted to Inpatient with expected LOS greater than two midnights due to medical therapy. Chief Complaint:  n/v/abd pain      History Of Present Illness:     80 y.o. female presents with n/v, abd pain onset 4 days ago. Any oral intake resulted in n/v, denies black/bloody emesis, change in bowel habits, fever, chills, worsening sob. Pain has been in the L flank, thigh, she has had some urinary hesitancy, dysuria. Past Medical History:          Diagnosis Date    Atrial fibrillation ([facility]     Cardiomyopathy ([facility]     Hypertension        Past Surgical History:          Procedure Laterality Date    BACK SURGERY      CATARACT EXTRACTION, BILATERAL      COLON SURGERY      PACEMAKER INSERTION         Medications Prior to Admission:      Prior to Admission medications    Medication Sig Start Date End Date Taking? Authorizing Provider   Red Yeast Rice Extract (RED YEAST RICE PO) Take by mouth in the morning and at bedtime   Yes Historical Provider, MD   fluticasone (FLONASE) 50 MCG/ACT nasal spray  9/15/22   Historical Provider, MD   ALPRAZolam (XANAX) 0.25 MG tablet 0.25 mg daily as needed.  9/26/22   Historical Provider, MD   metoprolol succinate (TOPROL XL) 50 MG extended release tablet 50 mg 2 times daily 9/26/22   Historical Provider, MD   esomeprazole (NEXIUM) 40 MG delayed release capsule Take 40 mg by mouth in the morning and at bedtime 9/26/22   Historical Provider, MD   meloxicam (MOBIC) 7.5 MG tablet  7/6/22   Historical Provider, MD   dilTIAZem (CARDIZEM CD) 120 MG extended release capsule in the morning and at bedtime 9/7/22   Historical Provider, MD   PROLIA 60 MG/ML SOSY SC injection  10/3/22   Historical Provider, MD   Polyethyl Glycol-Propyl Glycol (SYSTANE OP) Apply to eye    Historical Provider, MD   olopatadine (PATANOL) 0.1 % ophthalmic solution 1 drop 2 times daily    Historical Provider, MD   Calcium Carb-Cholecalciferol (CALCIUM 600+D3 PO) Take by mouth daily    Historical Provider, MD   calcium carbonate (OSCAL) 500 MG TABS tablet Take 500 mg by mouth daily    Historical Provider, MD   VITAMIN E PO Take by mouth daily    Historical Provider, MD   Ferrous Gluconate (IRON 27 PO) Take by mouth daily    Historical Provider, MD   Omega-3 Fatty Acids (SALMON OIL-1000) 200 MG CAPS Take by mouth daily    Historical Provider, MD   ACETAMINOPHEN PO Take by mouth  Patient not taking: Reported on 11/5/2022    Historical Provider, MD   Multiple Vitamins-Minerals (PRESERVISION AREDS PO) Take by mouth    Historical Provider, MD       Allergies:  Sulfa antibiotics    Social History:         TOBACCO:   reports that she has never smoked. She has never used smokeless tobacco.  ETOH:   reports no history of alcohol use. Family History:       (+) premature CAD - siblings    REVIEW OF SYSTEMS:   Pertinent positives as noted in the HPI. All other systems reviewed and negative. PHYSICAL EXAM PERFORMED:    BP (!) 148/79   Pulse 98   Temp 96.9 °F (36.1 °C) (Oral)   Resp 18   Ht 5' 3\" (1.6 m)   Wt 150 lb (68 kg)   SpO2 95%   BMI 26.57 kg/m²     General appearance:  No apparent distress, appears stated age and cooperative. HEENT:  Normal cephalic, atraumatic without obvious deformity. Pupils equal, round,   Conjunctivae/corneas clear. Neck: Supple, with full range of motion. No jugular venous distention. Trachea midline. Respiratory:  Normal respiratory effort. Clear to auscultation, bilaterally without Rales/Wheezes/Rhonchi. Cardiovascular:  Regular rate and rhythm with normal S1/S2 without murmurs, rubs or gallops. Abdomen: Soft, non-tender, non-distended with normal bowel sounds. Musculoskeletal:  No clubbing, cyanosis or edema bilaterally. No calf tenderness  Skin: Skin color, texture, turgor normal.  No rashes or lesions.   Neurologic:  grossly non-focal.  Psychiatric:  Alert and oriented, thought content appropriate, normal insight         Labs:     Recent Labs     11/05/22  1152   WBC 16.2*   HGB 10.3*   HCT 30.8*        Recent Labs     11/05/22  1152   *   K 4.0   CL 95*   CO2 20*   BUN 31*   CREATININE 1.5*   CALCIUM 8.8     Recent Labs     11/05/22  1152   AST 15   ALT 17   BILITOT 0.4   ALKPHOS 114     No results for input(s): INR in the last 72 hours. Recent Labs     11/05/22  1152   TROPONINI <0.01       Urinalysis:      Lab Results   Component Value Date/Time    NITRU Negative 11/05/2022 11:52 AM    WBCUA  11/05/2022 11:52 AM    BACTERIA 3+ 11/05/2022 11:52 AM    RBCUA 5-10 11/05/2022 11:52 AM    BLOODU LARGE 11/05/2022 11:52 AM    SPECGRAV >=1.030 11/05/2022 11:52 AM    GLUCOSEU Negative 11/05/2022 11:52 AM       Radiology:        CT ABDOMEN PELVIS WO CONTRAST Additional Contrast? None   Final Result   1. Moderate gallbladder distention with no calcified gallstones present nor   obvious findings of acute cholecystitis. Consider further evaluation with   sonography if there are clinical findings of cholecystitis. 2. Bilateral perinephric stranding can be seen normally with age but could   also be related to underlying inflammation such as pyelonephritis. 3. A 1.1 cm nodular opacity in the base of the lingula could be related to   rounded atelectasis, scarring, and granuloma, or a neoplasm. Recommend   follow-up chest CT in 3 months as below unless there are prior outside   studies to establish stability. 4. Trace left pleural effusion and trace pericardial effusion. RECOMMENDATIONS:   Incidental Pulmonary Nodules on CT      - Solid nodules, single, high risk      >8 mm:  Consider CT 3 months, PET/CT, or tissue sampling      Reference:  Mickey et al.  Guidelines for management of incidental   pulmonary nodules detected on CT images: From the Fleischner Society 2017. Radiology 5400;[id number]. ASSESSMENT:    Active Hospital Problems    Diagnosis Date Noted    UTI (urinary tract infection) [N39.0] 11/05/2022     Priority: Medium         PLAN:    1) Pyelo  - IV rocephin  - prn IV morphine, anti emetics     2) Afib  - rate controlled    3) hyponatremia  - hypovol, IVFs    DVT Prophylaxis: lovenox  Diet: ADULT DIET; Full Liquid  Code Status: Full Adan Calloway MD    Thank you Dinora Arce MD for the opportunity to be involved in this patient's care. If you have any questions or concerns please feel free to contact me at [phone].

## 2022-11-06 NOTE — FLOWSHEET NOTE
11/06/22 0830   Vital Signs   Temp 98.5 °F (36.9 °C)   Temp Source Oral   Heart Rate 100   Heart Rate Source Monitor   Resp 16   BP (!) 158/72   BP Location Left upper arm   MAP (Calculated) 100.67   Patient Position High fowlers   Level of Consciousness 0   MEWS Score 1   Oxygen Therapy   SpO2 97 %   O2 Device None (Room air)     Pt assessment completed, vss, see flow sheet. Pt alert and oriented x 4. Pt denies any needs at this time.  Chioma Ornelas RN

## 2022-11-06 NOTE — PROGRESS NOTES
IM Progress Note    Admit Date:  11/5/2022  1    Interval history:  abd pain , uti     Subjective:  Ms. Althea Jacome seen up in bed, reports nausea since Tuesday and unable to keep anything down with left flank pain   Hx of UTI in the past    No right UQ pain     Objective:   BP (!) 155/78   Pulse 91   Temp 98.4 °F (36.9 °C) (Oral)   Resp 17   Ht 5' 3\" (1.6 m)   Wt 150 lb (68 kg)   SpO2 96%   BMI 26.57 kg/m²     Intake/Output Summary (Last 24 hours) at 11/6/2022 0647  Last data filed at 11/6/2022 0534  Gross per 24 hour   Intake 50 ml   Output 750 ml   Net -700 ml       Physical Exam:        General:  eldelry female healthy appearing   Up in bed  Awake, alert and oriented. Appears to be not in any distress  Mucous Membranes:  Pink , anicteric  Neck: No JVD, no carotid bruit, no thyromegaly  Chest:  Clear to auscultation bilaterally, no added sounds  Cardiovascular:  RRR S1S2 heard, no murmurs or gallops  Abdomen:  Soft, undistended, non tender, no organomegaly, BS present  No flank tenderness  Extremities: No edema or cyanosis.  Distal pulses well felt  Neurological : grossly normal      Medications:   Scheduled Medications:    dilTIAZem  120 mg Oral Daily    pantoprazole  40 mg Oral BID AC    metoprolol succinate  50 mg Oral Daily    ketotifen  1 drop Both Eyes BID    cefTRIAXone (ROCEPHIN) IV  1,000 mg IntraVENous Q24H    sodium chloride flush  5-40 mL IntraVENous 2 times per day    enoxaparin  30 mg SubCUTAneous Daily     I   dextrose 5% and 0.9% NaCl with KCl 20 mEq 100 mL/hr at 11/05/22 1951    sodium chloride       sodium chloride flush, sodium chloride, ondansetron **OR** ondansetron, polyethylene glycol, acetaminophen **OR** acetaminophen, morphine    Lab Data:  Recent Labs     11/05/22  1152 11/06/22  0324   WBC 16.2* 11.5*   HGB 10.3* 8.6*   HCT 30.8* 25.8*   MCV 94.1 94.4    232     Recent Labs     11/05/22  1152 11/06/22  0324   * 130*   K 4.0 4.1   CL 95* 101   CO2 20* 20*   PHOS  --  1.4* BUN 31* 25*   CREATININE 1.5* 1.3*     Recent Labs     11/05/22  1152   TROPONINI <0.01       Coagulation: No results found for: INR, APTT  Cardiac markers:   Lab Results   Component Value Date/Time    TROPONINI <0.01 11/05/2022 11:52 AM         Lab Results   Component Value Date    ALT 17 11/05/2022    AST 15 11/05/2022    ALKPHOS 114 11/05/2022    BILITOT 0.4 11/05/2022       No results found for: INR, PROTIME    Radiology    CT ABDOMEN PELVIS WO CONTRAST Additional Contrast? None   Final Result   1. Moderate gallbladder distention with no calcified gallstones present nor   obvious findings of acute cholecystitis. Consider further evaluation with   sonography if there are clinical findings of cholecystitis. 2. Bilateral perinephric stranding can be seen normally with age but could   also be related to underlying inflammation such as pyelonephritis. 3. A 1.1 cm nodular opacity in the base of the lingula could be related to   rounded atelectasis, scarring, and granuloma, or a neoplasm. Recommend   follow-up chest CT in 3 months as below unless there are prior outside   studies to establish stability. 4. Trace left pleural effusion and trace pericardial effusion. RECOMMENDATIONS:   Incidental Pulmonary Nodules on CT       - Solid nodules, single, high risk       >8 mm:  Consider CT 3 months, PET/CT, or tissue sampling       Reference:  Mickey et al.  Guidelines for management of incidental   pulmonary nodules detected on CT images: From the Fleischner Society 2017. Radiology 4011;[id number]. Cultures  Blood- NGTD  Sputum - pending            Assessment & Plan:      1) Pyelonephritis bilateral per imaging but suspect left sided with symptoms of left flank pain   - IV rocephin  - prn IV morphine, anti emetics  - await urine and blood cx  - no fevers, wbc improving.  Stable BP , no sepsis      2) Afib  - rate controlled on Cardizem and metoprolol   - not on AC      3) hyponatremia  - hypovolemic , given  IVFs      4) lung opacity - 1.1 cm nodule in lingula , need repeat ct in 3 months       4) chronic anxiety - on xanax prn     5) GERD - on ppi      DVT Prophylaxis: lovenox  Diet: ADULT DIET; regular   Code Status: Full Code        Miryam Elaine MD, 11/6/2022 6:47 AM

## 2022-11-06 NOTE — PLAN OF CARE
Problem: Discharge Planning  Goal: Discharge to home or other facility with appropriate resources  11/6/2022 1243 by Gretchen Merino RN  Outcome: Progressing  11/6/2022 0151 by Misha Rodney RN  Outcome: Progressing  11/6/2022 0151 by Misha Rodney, RN  Outcome: Progressing     Problem: Pain  Goal: Verbalizes/displays adequate comfort level or baseline comfort level  11/6/2022 1243 by Gretchen Merino RN  Outcome: Progressing  11/6/2022 0151 by Misha Rodney, RN  Outcome: Progressing  11/6/2022 0151 by Misha Rodney, RN  Outcome: Progressing     Problem: Safety - Adult  Goal: Free from fall injury  11/6/2022 1243 by Gretchen Merino RN  Outcome: Progressing  11/6/2022 0151 by Misha Rodney RN  Outcome: Progressing  11/6/2022 0151 by Misha Rodney, RN  Outcome: Progressing

## 2022-11-07 ENCOUNTER — APPOINTMENT (OUTPATIENT)
Dept: CT IMAGING | Age: 82
DRG: 690 | End: 2022-11-07
Payer: MEDICARE

## 2022-11-07 PROBLEM — J90 PLEURAL EFFUSION: Status: ACTIVE | Noted: 2022-11-07

## 2022-11-07 PROBLEM — R91.1 PULMONARY NODULE: Status: ACTIVE | Noted: 2022-11-07

## 2022-11-07 PROBLEM — E87.1 HYPONATREMIA: Status: ACTIVE | Noted: 2022-11-07

## 2022-11-07 LAB
ALBUMIN SERPL-MCNC: 2.4 G/DL (ref 3.4–5)
ANION GAP SERPL CALCULATED.3IONS-SCNC: 8 MMOL/L (ref 3–16)
BASOPHILS ABSOLUTE: 0 K/UL (ref 0–0.2)
BASOPHILS RELATIVE PERCENT: 0.2 %
BUN BLDV-MCNC: 16 MG/DL (ref 7–20)
CALCIUM SERPL-MCNC: 6.8 MG/DL (ref 8.3–10.6)
CHLORIDE BLD-SCNC: 98 MMOL/L (ref 99–110)
CO2: 19 MMOL/L (ref 21–32)
CREAT SERPL-MCNC: 1.3 MG/DL (ref 0.6–1.2)
EOSINOPHILS ABSOLUTE: 0.1 K/UL (ref 0–0.6)
EOSINOPHILS RELATIVE PERCENT: 1.2 %
GFR SERPL CREATININE-BSD FRML MDRD: 41 ML/MIN/{1.73_M2}
GLUCOSE BLD-MCNC: 95 MG/DL (ref 70–99)
HCT VFR BLD CALC: 26.7 % (ref 36–48)
HEMOGLOBIN: 9.2 G/DL (ref 12–16)
LYMPHOCYTES ABSOLUTE: 1.5 K/UL (ref 1–5.1)
LYMPHOCYTES RELATIVE PERCENT: 14.2 %
MCH RBC QN AUTO: 32.3 PG (ref 26–34)
MCHC RBC AUTO-ENTMCNC: 34.3 G/DL (ref 31–36)
MCV RBC AUTO: 94.1 FL (ref 80–100)
MONOCYTES ABSOLUTE: 1.2 K/UL (ref 0–1.3)
MONOCYTES RELATIVE PERCENT: 11.4 %
NEUTROPHILS ABSOLUTE: 7.7 K/UL (ref 1.7–7.7)
NEUTROPHILS RELATIVE PERCENT: 73 %
ORGANISM: ABNORMAL
PDW BLD-RTO: 13.3 % (ref 12.4–15.4)
PHOSPHORUS: 1.3 MG/DL (ref 2.5–4.9)
PLATELET # BLD: 290 K/UL (ref 135–450)
PMV BLD AUTO: 6.3 FL (ref 5–10.5)
POTASSIUM SERPL-SCNC: 4.1 MMOL/L (ref 3.5–5.1)
RBC # BLD: 2.83 M/UL (ref 4–5.2)
SODIUM BLD-SCNC: 125 MMOL/L (ref 136–145)
URINE CULTURE, ROUTINE: ABNORMAL
WBC # BLD: 10.6 K/UL (ref 4–11)

## 2022-11-07 PROCEDURE — 2580000003 HC RX 258: Performed by: INTERNAL MEDICINE

## 2022-11-07 PROCEDURE — 1200000000 HC SEMI PRIVATE

## 2022-11-07 PROCEDURE — 6360000002 HC RX W HCPCS: Performed by: INTERNAL MEDICINE

## 2022-11-07 PROCEDURE — 6370000000 HC RX 637 (ALT 250 FOR IP): Performed by: INTERNAL MEDICINE

## 2022-11-07 PROCEDURE — 36415 COLL VENOUS BLD VENIPUNCTURE: CPT

## 2022-11-07 PROCEDURE — 85025 COMPLETE CBC W/AUTO DIFF WBC: CPT

## 2022-11-07 PROCEDURE — 2500000003 HC RX 250 WO HCPCS: Performed by: INTERNAL MEDICINE

## 2022-11-07 PROCEDURE — 99233 SBSQ HOSP IP/OBS HIGH 50: CPT | Performed by: INTERNAL MEDICINE

## 2022-11-07 PROCEDURE — 71250 CT THORAX DX C-: CPT

## 2022-11-07 PROCEDURE — 99222 1ST HOSP IP/OBS MODERATE 55: CPT | Performed by: INTERNAL MEDICINE

## 2022-11-07 PROCEDURE — 80069 RENAL FUNCTION PANEL: CPT

## 2022-11-07 RX ORDER — DILTIAZEM HYDROCHLORIDE 120 MG/1
120 CAPSULE, EXTENDED RELEASE ORAL 2 TIMES DAILY
Status: DISCONTINUED | OUTPATIENT
Start: 2022-11-07 | End: 2022-11-08 | Stop reason: HOSPADM

## 2022-11-07 RX ORDER — METOPROLOL SUCCINATE 50 MG/1
50 TABLET, EXTENDED RELEASE ORAL 2 TIMES DAILY
Status: DISCONTINUED | OUTPATIENT
Start: 2022-11-07 | End: 2022-11-08 | Stop reason: HOSPADM

## 2022-11-07 RX ORDER — DILTIAZEM HYDROCHLORIDE 5 MG/ML
10 INJECTION INTRAVENOUS ONCE
Status: DISCONTINUED | OUTPATIENT
Start: 2022-11-07 | End: 2022-11-08 | Stop reason: HOSPADM

## 2022-11-07 RX ADMIN — KETOTIFEN FUMARATE 1 DROP: 0.35 SOLUTION/ DROPS OPHTHALMIC at 20:49

## 2022-11-07 RX ADMIN — Medication 10 ML: at 20:49

## 2022-11-07 RX ADMIN — PANTOPRAZOLE SODIUM 40 MG: 40 TABLET, DELAYED RELEASE ORAL at 05:44

## 2022-11-07 RX ADMIN — DEXTROSE, SODIUM CHLORIDE, AND POTASSIUM CHLORIDE: 5; .9; .15 INJECTION INTRAVENOUS at 07:51

## 2022-11-07 RX ADMIN — Medication 10 ML: at 08:24

## 2022-11-07 RX ADMIN — PANTOPRAZOLE SODIUM 40 MG: 40 TABLET, DELAYED RELEASE ORAL at 16:05

## 2022-11-07 RX ADMIN — ENOXAPARIN SODIUM 30 MG: 100 INJECTION SUBCUTANEOUS at 08:23

## 2022-11-07 RX ADMIN — METOPROLOL SUCCINATE 50 MG: 50 TABLET, EXTENDED RELEASE ORAL at 20:49

## 2022-11-07 RX ADMIN — METOPROLOL SUCCINATE 50 MG: 50 TABLET, EXTENDED RELEASE ORAL at 08:23

## 2022-11-07 RX ADMIN — DILTIAZEM HYDROCHLORIDE 120 MG: 120 CAPSULE, EXTENDED RELEASE ORAL at 08:23

## 2022-11-07 RX ADMIN — DILTIAZEM HYDROCHLORIDE 120 MG: 120 CAPSULE, EXTENDED RELEASE ORAL at 20:49

## 2022-11-07 RX ADMIN — CEFTRIAXONE SODIUM 1000 MG: 1 INJECTION, POWDER, FOR SOLUTION INTRAMUSCULAR; INTRAVENOUS at 16:09

## 2022-11-07 RX ADMIN — KETOTIFEN FUMARATE 1 DROP: 0.35 SOLUTION/ DROPS OPHTHALMIC at 08:24

## 2022-11-07 ASSESSMENT — PAIN SCALES - GENERAL
PAINLEVEL_OUTOF10: 0

## 2022-11-07 NOTE — PLAN OF CARE
Problem: Discharge Planning  Goal: Discharge to home or other facility with appropriate resources  11/6/2022 2342 by Deon Leonard RN  Outcome: Progressing  11/6/2022 1243 by Farzana Enrique RN  Outcome: Progressing     Problem: Pain  Goal: Verbalizes/displays adequate comfort level or baseline comfort level  11/6/2022 2342 by Deon Leonard RN  Outcome: Progressing  11/6/2022 1243 by Farzana Enrique RN  Outcome: Progressing     Problem: Safety - Adult  Goal: Free from fall injury  11/6/2022 2342 by Deon Leonard RN  Outcome: Progressing  11/6/2022 1243 by Farzana Enrique RN  Outcome: Progressing

## 2022-11-07 NOTE — ACP (ADVANCE CARE PLANNING)
Advance Care Planning     General Advance Care Planning (ACP) Conversation    Date of Conversation: 11/5/2022  Conducted with: Patient with Decision Making Capacity    Healthcare Decision Maker:    Primary Decision Maker: wade naranjo - Child - [phone]    Primary Decision Maker: Hammad Almendarez - Child - [phone]  Click here to complete Healthcare Decision Makers including selection of the Healthcare Decision Maker Relationship (ie \"Primary\"). Today we documented Decision Maker(s) consistent with Legal Next of Kin hierarchy. Content/Action Overview: Has ACP document(s) NOT on file - requested patient to provide  Reviewed DNR/DNI and patient elects Full Code (Attempt Resuscitation)    Patient states has completed Living Will document at home. Requested patient/family to provide.      Length of Voluntary ACP Conversation in minutes:  <16 minutes (Non-Billable)    Rebekah Dave RN

## 2022-11-07 NOTE — TELEPHONE ENCOUNTER
I called and spoke to [facility], pt is currently admitted and she is having a few different issues,  Advised we will be in touch with her after her admission to see if she still needs to complete the UGI or not, advised if she has any other questions to call me.

## 2022-11-07 NOTE — CARE COORDINATION
Case Management Assessment  Initial Evaluation      Patient Name: Brian Paul  YOB: 1940  Diagnosis: UTI (urinary tract infection) [N39.0]  Pyelonephritis [N12]  SIRS (systemic inflammatory response syndrome) ([facility] [R65.10]  NIK (acute kidney injury) ([facility] [N17.9]  Nausea and vomiting, unspecified vomiting type [R11.2]  Date / Time: 11/5/2022 10:51 AM    Admission status/Date: Inpatient 11/05/2022  Chart Reviewed: Yes      Patient Interviewed: Yes   Family Interviewed:  No      Hospitalization in the last 30 days:  No      Health Care Decision Maker :   Primary Decision Maker: wade naranjo - Child - [phone]    Primary Decision Maker: Lane Castellon [facility] - [phone]      Who do you trust or have selected to make healthcare decisions for you      Met with: Patient at bedside. Current PCP: Corey Adma MD     Financial  [facility]  Precert required for SNF : Y         3 night stay required - N    ADLS  Support Systems/Care Needs: Children, Family Members  Transportation: self   Meal Preparation: self    Housing  Living Arrangements: Lives in home alone  Steps: [address] for return to present living arrangements: Yes  Identified Issues: None at this time.     Home Care Information  Active with Home Health Care : No Agency:(Services)  Type of Home Care Services: None  Passport/Waiver : No  :                      Phone Number:    Passport/Waiver Services: n/a           Durable Medical Equiptment   DME Provider: n/a   Equipment: n/a     Home O2 Use :  No    If No for home O2---if presently on O2 during hospitalization:  No  if yes CM to follow for potential DC O2 need  Informed of need for care provider to bring portable home O2 tank on day of discharge for nursing to connect prior to leaving:   Not Indicated  Verbalized agreement/Understanding:   Not Indicated    Community Service Affiliation  Dialysis:  No    Agency:  Location:  Dialysis Schedule:  Phone: Fax:    Other Community Services: n/a     DISCHARGE PLAN: Explained Case Management role/services. CM reviewed chart and met with patient at bedside to discuss discharge needs and plan. Patient lives in home alone. IPTA and active . Son lives across field from patient and provides assistance as needed. Plans to return home at discharge. No needs identified for discharge intervention at this time. CM will follow peripherally.     Bk Ndiaye RN

## 2022-11-07 NOTE — FLOWSHEET NOTE
11/06/22 2200   Vital Signs   Temp 98.2 °F (36.8 °C)   Temp Source Oral   Heart Rate 85   Resp 17   BP (!) 140/75   BP Location Left upper arm   MAP (Calculated) 96.67   Patient Position Up in chair   Level of Consciousness 0   MEWS Score 1   Pain Assessment   Pain Assessment 0-10   Pain Level 5   Oxygen Therapy   SpO2 96 %   O2 Device None (Room air)   HS assessment completed, see flow sheet. Pt is alert and oriented. BP is elevated, no s/s of distress noted. Respirations are even & easy. No complaints voiced. Pt denies needs at this time. SR up x 2, and bed in low position. Call light is within reach. Bedside Mobility Assessment Tool (BMAT):     Assessment Level 1- Sit and Shake    1. From a semi-reclined position, ask patient to sit up and rotate to a seated position at the side of the bed. Can use the bedrail. 2. Ask patient to reach out and grab your hand and shake making sure patient reaches across his/her midline. Pass- Patient is able to come to a seated position, maintain core strength. Maintains seated balance while reaching across midline. Move on to Assessment Level 2. Assessment Level 2- Stretch and Point   1. With patient in seated position at the side of the bed, have patient place both feet on the floor (or stool) with knees no higher than hips. 2. Ask patient to stretch one leg and straighten the knee, then bend the ankle/flex and point the toes. If appropriate, repeat with the other leg. Pass- Patient is able to demonstrate appropriate quad strength on intended weight bearing limb(s). Move onto Assessment Level 3. Assessment Level 3- Stand   1. Ask patient to elevate off the bed or chair (seated to standing) using an assistive device (cane, bedrail). 2. Patient should be able to raise buttocks off be and hold for a count of five. May repeat once. Pass- Patient maintains standing stability for at least 5 seconds, proceed to assessment level 4.     Assessment Level 4- Walk   1. Ask patient to march in place at bedside. 2. Then ask patient to advance step and return each foot. Some medical conditions may render a patient from stepping backwards, use your best clinical judgement. Pass- Patient demonstrates balance while shifting weight and ability to step, takes independent steps, does not use assistive device patient is MOBILITY LEVEL 4. Mobility Level- 4    Patient is able to demonstrate the ability to move from a reclining position to an upright position within the recliner.

## 2022-11-07 NOTE — PROGRESS NOTES
11/06/22  0324 11/07/22  0600   * 130* 125*   K 4.0 4.1 4.1   CL 95* 101 98*   CO2 20* 20* 19*   PHOS  --  1.4* 1.3*   BUN 31* 25* 16   CREATININE 1.5* 1.3* 1.3*       Recent Labs     11/05/22  1152   TROPONINI <0.01         Coagulation: No results found for: INR, APTT  Cardiac markers:   Lab Results   Component Value Date/Time    TROPONINI <0.01 11/05/2022 11:52 AM         Lab Results   Component Value Date    ALT 17 11/05/2022    AST 15 11/05/2022    ALKPHOS 114 11/05/2022    BILITOT 0.4 11/05/2022       No results found for: INR, PROTIME    Radiology    CT ABDOMEN PELVIS WO CONTRAST Additional Contrast? None   Final Result   1. Moderate gallbladder distention with no calcified gallstones present nor   obvious findings of acute cholecystitis. Consider further evaluation with   sonography if there are clinical findings of cholecystitis. 2. Bilateral perinephric stranding can be seen normally with age but could   also be related to underlying inflammation such as pyelonephritis. 3. A 1.1 cm nodular opacity in the base of the lingula could be related to   rounded atelectasis, scarring, and granuloma, or a neoplasm. Recommend   follow-up chest CT in 3 months as below unless there are prior outside   studies to establish stability. 4. Trace left pleural effusion and trace pericardial effusion. RECOMMENDATIONS:   Incidental Pulmonary Nodules on CT      - Solid nodules, single, high risk      >8 mm:  Consider CT 3 months, PET/CT, or tissue sampling      Reference:  Candyhon et al.  Guidelines for management of incidental   pulmonary nodules detected on CT images: From the Fleischner Society 2017. Radiology 9536;[id number].                 Cultures  Blood- NGTD  Sputum - pending  Urine culture: E coli  Escherichia coli (1)    Antibiotic Interpretation Microscan  Method Status    ampicillin Sensitive <=2 mcg/mL BACTERIAL SUSCEPTIBILITY PANEL BY ROXANA     ampicillin-sulbactam Sensitive <=2 mcg/mL BACTERIAL SUSCEPTIBILITY PANEL BY ROXANA     ceFAZolin Sensitive <=4 mcg/mL BACTERIAL SUSCEPTIBILITY PANEL BY ROXANA      NOTE: Cefazolin should only be used for uncomplicated UTI         for E.coli or Klebsiella pneumoniae. cefepime Sensitive <=0.12 mcg/mL BACTERIAL SUSCEPTIBILITY PANEL BY ROXANA     cefTRIAXone Sensitive <=0.25 mcg/mL BACTERIAL SUSCEPTIBILITY PANEL BY ROXANA     ciprofloxacin Sensitive <=0.25 mcg/mL BACTERIAL SUSCEPTIBILITY PANEL BY ROXANA     ertapenem Sensitive <=0.12 mcg/mL BACTERIAL SUSCEPTIBILITY PANEL BY ROXANA     gentamicin Sensitive <=1 mcg/mL BACTERIAL SUSCEPTIBILITY PANEL BY ROXANA     levofloxacin Sensitive <=0.12 mcg/mL BACTERIAL SUSCEPTIBILITY PANEL BY ROXANA     nitrofurantoin Sensitive <=16 mcg/mL BACTERIAL SUSCEPTIBILITY PANEL BY ROXANA     piperacillin-tazobactam Sensitive <=4 mcg/mL BACTERIAL SUSCEPTIBILITY PANEL BY ROXANA     trimethoprim-sulfamethoxazole Sensitive <=20 mcg/mL BACTERIAL SUSCEPTIBILITY PANEL BY ROXANA               Assessment & Plan:      1) Pyelonephritis bilateral per imaging but suspect left sided with symptoms of left flank pain   - IV rocephin. Can change to PO Cipro at discharge. - prn IV morphine, anti emetics  - await urine and blood cx  - no fevers, wbc improving. Stable BP , no sepsis      2) Afib. In and out of a fib. I put her on a heart monitor.  - rate controlled on Cardizem and metoprolol   - not on AC      3) hyponatremia  - stop IVF. Fluid restriction 1500 cc. Recheck in am      4) lung opacity - 1.1 cm nodule in lingula. Consult pulmonary for lung nodule.        4) chronic anxiety - on xanax prn     5) GERD - on ppi      DVT Prophylaxis: lovenox  Diet: ADULT DIET; regular   Code Status: Full Stormy Acevedo MD, 11/7/2022 11:37 AM

## 2022-11-07 NOTE — PROGRESS NOTES
Transferred care to Gretchen Norris RN. Face to face bedside report given, no need voiced at this time.

## 2022-11-07 NOTE — PROGRESS NOTES
Resting in bed awake. No complaints or needs at present time. Am assessment complete. Alert and oriented. Bed alarm in place and turned on. Call light in reach. Patient is able to demonstrate the ability to move from a reclining position to an upright position within the recliner. Bedside Mobility Assessment Tool (BMAT):     Assessment Level 1- Sit and Shake    1. From a semi-reclined position, ask patient to sit up and rotate to a seated position at the side of the bed. Can use the bedrail. 2. Ask patient to reach out and grab your hand and shake making sure patient reaches across his/her midline. Pass- Patient is able to come to a seated position, maintain core strength. Maintains seated balance while reaching across midline. Move on to Assessment Level 2. Assessment Level 2- Stretch and Point   1. With patient in seated position at the side of the bed, have patient place both feet on the floor (or stool) with knees no higher than hips. 2. Ask patient to stretch one leg and straighten the knee, then bend the ankle/flex and point the toes. If appropriate, repeat with the other leg. Pass- Patient is able to demonstrate appropriate quad strength on intended weight bearing limb(s). Move onto Assessment Level 3. Assessment Level 3- Stand   1. Ask patient to elevate off the bed or chair (seated to standing) using an assistive device (cane, bedrail). 2. Patient should be able to raise buttocks off be and hold for a count of five. May repeat once. Pass- Patient maintains standing stability for at least 5 seconds, proceed to assessment level 4. Assessment Level 4- Walk   1. Ask patient to march in place at bedside. 2. Then ask patient to advance step and return each foot. Some medical conditions may render a patient from stepping backwards, use your best clinical judgement.    Pass- Patient demonstrates balance while shifting weight and ability to step, takes independent steps, does not use assistive device patient is MOBILITY LEVEL 4.       Mobility Level- 4

## 2022-11-07 NOTE — PROGRESS NOTES
Resting in bed awake watching tv. No complaints or needs at present time. Call light in reach. Bed alarm in place and turned on.

## 2022-11-08 VITALS
BODY MASS INDEX: 28.99 KG/M2 | TEMPERATURE: 98.1 F | HEART RATE: 79 BPM | SYSTOLIC BLOOD PRESSURE: 144 MMHG | WEIGHT: 163.6 LBS | RESPIRATION RATE: 16 BRPM | HEIGHT: 63 IN | OXYGEN SATURATION: 98 % | DIASTOLIC BLOOD PRESSURE: 81 MMHG

## 2022-11-08 LAB
ALBUMIN SERPL-MCNC: 2.7 G/DL (ref 3.4–5)
ANION GAP SERPL CALCULATED.3IONS-SCNC: 11 MMOL/L (ref 3–16)
BASOPHILS ABSOLUTE: 0 K/UL (ref 0–0.2)
BASOPHILS RELATIVE PERCENT: 0.5 %
BUN BLDV-MCNC: 14 MG/DL (ref 7–20)
CALCIUM SERPL-MCNC: 6.8 MG/DL (ref 8.3–10.6)
CHLORIDE BLD-SCNC: 102 MMOL/L (ref 99–110)
CO2: 19 MMOL/L (ref 21–32)
CREAT SERPL-MCNC: 1.1 MG/DL (ref 0.6–1.2)
EOSINOPHILS ABSOLUTE: 0.2 K/UL (ref 0–0.6)
EOSINOPHILS RELATIVE PERCENT: 2 %
GFR SERPL CREATININE-BSD FRML MDRD: 50 ML/MIN/{1.73_M2}
GLUCOSE BLD-MCNC: 94 MG/DL (ref 70–99)
HCT VFR BLD CALC: 26.3 % (ref 36–48)
HEMOGLOBIN: 9 G/DL (ref 12–16)
LYMPHOCYTES ABSOLUTE: 1.2 K/UL (ref 1–5.1)
LYMPHOCYTES RELATIVE PERCENT: 14.1 %
MCH RBC QN AUTO: 32 PG (ref 26–34)
MCHC RBC AUTO-ENTMCNC: 34.3 G/DL (ref 31–36)
MCV RBC AUTO: 93.3 FL (ref 80–100)
MONOCYTES ABSOLUTE: 0.9 K/UL (ref 0–1.3)
MONOCYTES RELATIVE PERCENT: 10.4 %
NEUTROPHILS ABSOLUTE: 6.5 K/UL (ref 1.7–7.7)
NEUTROPHILS RELATIVE PERCENT: 73 %
PDW BLD-RTO: 13.4 % (ref 12.4–15.4)
PHOSPHORUS: 1.5 MG/DL (ref 2.5–4.9)
PLATELET # BLD: 317 K/UL (ref 135–450)
PMV BLD AUTO: 6.5 FL (ref 5–10.5)
POTASSIUM REFLEX MAGNESIUM: 4.4 MMOL/L (ref 3.5–5.1)
POTASSIUM SERPL-SCNC: 4.4 MMOL/L (ref 3.5–5.1)
RBC # BLD: 2.82 M/UL (ref 4–5.2)
SODIUM BLD-SCNC: 132 MMOL/L (ref 136–145)
WBC # BLD: 8.9 K/UL (ref 4–11)

## 2022-11-08 PROCEDURE — 99239 HOSP IP/OBS DSCHRG MGMT >30: CPT | Performed by: INTERNAL MEDICINE

## 2022-11-08 PROCEDURE — 99232 SBSQ HOSP IP/OBS MODERATE 35: CPT | Performed by: INTERNAL MEDICINE

## 2022-11-08 PROCEDURE — 6370000000 HC RX 637 (ALT 250 FOR IP): Performed by: INTERNAL MEDICINE

## 2022-11-08 PROCEDURE — 80069 RENAL FUNCTION PANEL: CPT

## 2022-11-08 PROCEDURE — 6360000002 HC RX W HCPCS: Performed by: INTERNAL MEDICINE

## 2022-11-08 PROCEDURE — 36415 COLL VENOUS BLD VENIPUNCTURE: CPT

## 2022-11-08 PROCEDURE — 2580000003 HC RX 258: Performed by: INTERNAL MEDICINE

## 2022-11-08 PROCEDURE — 85025 COMPLETE CBC W/AUTO DIFF WBC: CPT

## 2022-11-08 RX ORDER — ASPIRIN 81 MG/1
81 TABLET ORAL DAILY
Qty: 90 TABLET | Refills: 1
Start: 2022-11-08

## 2022-11-08 RX ORDER — POTASSIUM PHOSPHATE, MONOBASIC 500 MG/1
500 TABLET, SOLUBLE ORAL 2 TIMES DAILY
Qty: 20 TABLET | Refills: 0 | Status: SHIPPED | OUTPATIENT
Start: 2022-11-08 | End: 2022-11-18

## 2022-11-08 RX ORDER — CIPROFLOXACIN 250 MG/1
250 TABLET, FILM COATED ORAL 2 TIMES DAILY
Qty: 10 TABLET | Refills: 0 | Status: SHIPPED | OUTPATIENT
Start: 2022-11-08 | End: 2022-11-13

## 2022-11-08 RX ADMIN — KETOTIFEN FUMARATE 1 DROP: 0.35 SOLUTION/ DROPS OPHTHALMIC at 09:37

## 2022-11-08 RX ADMIN — METOPROLOL SUCCINATE 50 MG: 50 TABLET, EXTENDED RELEASE ORAL at 09:38

## 2022-11-08 RX ADMIN — PANTOPRAZOLE SODIUM 40 MG: 40 TABLET, DELAYED RELEASE ORAL at 05:56

## 2022-11-08 RX ADMIN — DILTIAZEM HYDROCHLORIDE 120 MG: 120 CAPSULE, EXTENDED RELEASE ORAL at 09:38

## 2022-11-08 RX ADMIN — Medication 5 ML: at 09:43

## 2022-11-08 RX ADMIN — ENOXAPARIN SODIUM 30 MG: 100 INJECTION SUBCUTANEOUS at 09:39

## 2022-11-08 ASSESSMENT — PAIN SCALES - GENERAL: PAINLEVEL_OUTOF10: 0

## 2022-11-08 NOTE — PROGRESS NOTES
D/c instructions given to pt with explanation of new medication. Verbalized understanding of instructions. Called for ride to come and . Will call nurse when ride arrives.

## 2022-11-08 NOTE — CARE COORDINATION
DISCHARGE ORDER  Date/Time 2022 3:47 PM  Completed by: Em Stock RN, Case Management    Patient Name: Minor Centeno      : 1940  Admitting Diagnosis: UTI (urinary tract infection) [N39.0]  Pyelonephritis [N12]  SIRS (systemic inflammatory response syndrome) ([facility] [address] [R65.10]  NIK (acute kidney injury) ([facility] [address] [N17.9]  Nausea and vomiting, unspecified vomiting type [R11.2]      Admit order Date and Status:22 inpt  (verify MD's last order for status of admission)      Noted discharge order. If applicable PT/OT recommendation at Discharge: N/A  DME recommendation by PT/OT:N/A  Confirmed discharge plan : Yes  with whom patient  If pt confirmed DC plan does family need to be contacted by CM No I Discharge Plan: Order for dc noted. Spoke with pt who cont plan for home. Declines HHC. Denies needs. Chart reviewed and no dc needs identified. Date of Last IMM Given: 22    Reviewed chart. Role of discharge planner explained and patient verbalized understanding. Discharge order is noted. Has Home O2 in place on admit:  No  Informed of need to bring portable home O2 tank on day of discharge for nursing to connect prior to leaving:   Not Indicated  Verbalized agreement/Understanding:   Not Indicated  Pt is being d/c'd to home today. Pt's O2 sats are 98% on RA. Discharge timeout done with nsg, CM and pt. All discharge needs and concerns addressed.

## 2022-11-08 NOTE — PROGRESS NOTES
Monitor tech called pt HR irregular looking more like A flutter. Pt resting in bed at this time. Will inform Dr. Julia Connor takes Diltiazem 120 twice a day and its ordered here daily.

## 2022-11-08 NOTE — FLOWSHEET NOTE
11/07/22 2018   Vital Signs   Temp 97.7 °F (36.5 °C)   Temp Source Oral   Heart Rate (!) 108   Heart Rate Source Monitor   Resp 16   BP (!) 149/79   BP Location Left upper arm   BP Method Automatic   MAP (Calculated) 102.33   Patient Position Semi fowlers   Level of Consciousness 0   MEWS Score 2   Oxygen Therapy   SpO2 97 %   O2 Device None (Room air)   Pt A/O assessment completed. Pt complains of SOB at rest and with activity. Meds given per MAR. Pt informed that she will now be receiving her home dose of Toprol XL and Cardizem. IV leaking IV removed and dressing applied. New IV placed at this time. Pt denies any needs.  Call light within reach and bed alarm

## 2022-11-08 NOTE — DISCHARGE INSTRUCTIONS
Your information:  Name: Priti Moss  : 1940    Your instructions: Follow instructions below. What to do after you leave the hospital:    Recommended diet: regular diet    Recommended activity: activity as tolerated        The following personal items were collected during your admission and were returned to you:    Belongings  Dental Appliances: None  Vision - Corrective Lenses: Eyeglasses  Hearing Aid: None  Clothing: Undergarments, Pants, Shirt, Socks, Footwear  Jewelry: Ring, Necklace (ring x 3)  Body Piercings Removed: N/A  Electronic Devices: Cell Phone,   Weapons (Notify Protective Services/Security): None  Other Valuables: Purse  Home Medications: None  Valuables Given To: Patient  Provide Name(s) of Who Valuable(s) Were Given To: n/a  Responsible person(s) in the waiting room: n/a  Patient approves for provider to speak to responsible person post operatively: No    Information obtained by:  By signing below, I understand that if any problems occur once I leave the hospital I am to contact MD.  I understand and acknowledge receipt of the instructions indicated above.

## 2022-11-08 NOTE — DISCHARGE SUMMARY
Name:  Markos Carrillo  Room:  0205/0205-02  MRN:    [id number]    Discharge Summary      This discharge summary is in conjunction with a complete physical exam done on the day of discharge. Discharging Physician: Lux Diop MD      Admit: 11/5/2022  Discharge:  11/8/2022     Diagnoses this Admission    Principal Problem:    UTI (urinary tract infection)  Active Problems:    NIK (acute kidney injury) (HCC)    Nausea and vomiting    Pyelonephritis    SIRS (systemic inflammatory response syndrome) ([facility])    Hyponatremia    Pulmonary nodule    Pleural effusion  Resolved Problems:    * No resolved hospital problems. *      Procedures (Please Review Full Report for Details)  none    Consults    IP CONSULT TO HOSPITALIST  IP CONSULT TO PULMONOLOGY      HPI:    80 y.o. female presents with n/v, abd pain onset 4 days ago. Any oral intake resulted in n/v, denies black/bloody emesis, change in bowel habits, fever, chills, worsening sob. Pain has been in the L flank, thigh, she has had some urinary hesitancy, dysuria. Physical Exam at Discharge:  BP (!) 144/81   Pulse 79   Temp 98.1 °F (36.7 °C) (Oral)   Resp 16   Ht 5' 3\" (1.6 m)   Wt 163 lb 9.6 oz (74.2 kg)   SpO2 98%   BMI 28.98 kg/m²     General:  eldelry female healthy appearing   Up in bed  Awake, alert and oriented. Appears to be not in any distress  Mucous Membranes:  Pink , anicteric  Neck: No JVD, no carotid bruit, no thyromegaly  Chest:  Clear to auscultation bilaterally, no added sounds  Cardiovascular: Irregularly irregular rate and rhythm, S1S2 heard, no murmurs or gallops  Abdomen:  Soft, undistended, non tender, no organomegaly, BS present  No flank tenderness  Extremities: No edema or cyanosis. Distal pulses well felt  Neurological : grossly normal       Hospital Course       1) Pyelonephritis bilateral per imaging but suspect left sided with symptoms of left flank pain   - IV rocephin. Can change to PO Cipro at discharge.   - prn IV morphine, anti emetics  - Had E coli in urine. BC NGTD. Discharge on PO Cipro. - no fevers, wbc improving. Stable BP , no sepsis      2) Afib. In and out of a fib. I put her on a heart monitor.  - rate controlled on Cardizem and metoprolol   - not on AC. She is not willing to take NOAC or Coumadin. Aware of stroke risk. [id number] Score for Atrial Fibrillation Stroke Risk   Risk   Factors  Component Value   C CHF No 0   H HTN No 0   A2 Age >= 76 Yes,  (80 y.o.) 2   D DM No 0   S2 Prior Stroke/TIA No 0   V Vascular Disease No 0   A Age 74-69 No,  (80 y.o.) 0   Sc Sex female 1    [id number]  Score  3   Score last updated 11/8/22 16:14 AM EST    Click here for a link to the UpToDate guideline \"Atrial Fibrillation: Anticoagulation therapy to prevent embolization    Disclaimer: Risk Score calculation is dependent on accuracy of patient problem list and past encounter diagnosis. 3) hyponatremia  - stop IVF. Fluid restriction 1500 cc. Improved. 4) lung opacity - 1.1 cm nodule in lingula. Consult pulmonary for lung nodule. OP follow up.     4) chronic anxiety - on xanax prn      5) GERD - on ppi     6) I sent a script for oral phosphorus. I expect calcium to correct on it own. Corrected calcium for albumin is 7.8 mg/dl. 7) Mild NIK on CKD stage 3b. Improved with IVF. CBC:   Recent Labs     11/06/22  0324 11/07/22  0600 11/08/22  0523   WBC 11.5* 10.6 8.9   HGB 8.6* 9.2* 9.0*   HCT 25.8* 26.7* 26.3*   MCV 94.4 94.1 93.3    290 317     BMP:   Recent Labs     11/06/22  0324 11/07/22  0600 11/08/22  0523   * 125* 132*   K 4.1 4.1 4.4  4.4    98* 102   CO2 20* 19* 19*   PHOS 1.4* 1.3* 1.5*   BUN 25* 16 14   CREATININE 1.3* 1.3* 1.1     LIVER PROFILE:   No results for input(s): AST, ALT, LIPASE, BILIDIR, BILITOT, ALKPHOS in the last 72 hours. Invalid input(s): AMYLASE,  ALB    PT/INR: No results for input(s): PROTIME, INR in the last 72 hours.   APTT: No results for input(s): APTT in the last 72 hours. UA:  No results for input(s): NITRITE, COLORU, PHUR, LABCAST, WBCUA, RBCUA, MUCUS, TRICHOMONAS, YEAST, BACTERIA, CLARITYU, SPECGRAV, LEUKOCYTESUR, UROBILINOGEN, BILIRUBINUR, BLOODU, GLUCOSEU, AMORPHOUS in the last 72 hours. Invalid input(s): KETONESU    Urine culture  Organism Escherichia coli Abnormal     Urine Culture, Routine >100,000 CFU/ml    Resulting Agency [address] [facility]        Susceptibility    Escherichia coli (1)    Antibiotic Interpretation Microscan  Method Status    ampicillin Sensitive <=2 mcg/mL BACTERIAL SUSCEPTIBILITY PANEL BY ROXANA     ampicillin-sulbactam Sensitive <=2 mcg/mL BACTERIAL SUSCEPTIBILITY PANEL BY ROXANA     ceFAZolin Sensitive <=4 mcg/mL BACTERIAL SUSCEPTIBILITY PANEL BY ROXANA      NOTE: Cefazolin should only be used for uncomplicated UTI         for E.coli or Klebsiella pneumoniae. cefepime Sensitive <=0.12 mcg/mL BACTERIAL SUSCEPTIBILITY PANEL BY ROXANA     cefTRIAXone Sensitive <=0.25 mcg/mL BACTERIAL SUSCEPTIBILITY PANEL BY ROXANA     ciprofloxacin Sensitive <=0.25 mcg/mL BACTERIAL SUSCEPTIBILITY PANEL BY ROXANA     ertapenem Sensitive <=0.12 mcg/mL BACTERIAL SUSCEPTIBILITY PANEL BY ROXANA     gentamicin Sensitive <=1 mcg/mL BACTERIAL SUSCEPTIBILITY PANEL BY ROXANA     levofloxacin Sensitive <=0.12 mcg/mL BACTERIAL SUSCEPTIBILITY PANEL BY ROXANA     nitrofurantoin Sensitive <=16 mcg/mL BACTERIAL SUSCEPTIBILITY PANEL BY ROXANA     piperacillin-tazobactam Sensitive <=4 mcg/mL BACTERIAL SUSCEPTIBILITY PANEL BY ROXANA     trimethoprim-sulfamethoxazole Sensitive <=20 mcg/mL BACTERIAL SUSCEPTIBILITY PANEL BY ROXANA        Narrative  Performed by: [facility]          CT CHEST WO CONTRAST   Final Result   1. New airspace opacity inferiorly in the lingula obscures the 1.4 cm nodule   noted in this region on the recent CT of the abdomen and pelvis. There are   indeterminate nodules in the upper lobes measuring as much as 3 mm.   A   follow-up unenhanced chest CT in 3-6 months is suggested. 2. New small pleural effusions with adjacent consolidation in the lower lobes   that could reflect atelectasis or pneumonia      RECOMMENDATIONS:   Lung nodule Fleischner Society guidelines for follow-up and management of   incidentally detected pulmonary nodules:      Multiple Solid Nodules:      ~Nodule size less than 6 mm. In a low-risk patient, no routine   follow-up. In a high-risk patient, optional CT at 12 months. ~Nodule size equals 6-8 mm. In a low-risk patient, CT at 3-6 months, then   consider CT at 18-24 months. In a high-risk patient, CT at 3-6 months, then   CT at 18-24 months. ~Nodule size greater than 8 mm. In a low-risk patient, CT at 3-6 months,   then consider CT at 18-24 months. In a high-risk patient, CT at 3-6 months,   then CT at 18-24 months. Radiology 2017 http://pubs. rsna.org/doi/full/10.1148/radiol. 2294661223         CT ABDOMEN PELVIS WO CONTRAST Additional Contrast? None   Final Result   1. Moderate gallbladder distention with no calcified gallstones present nor   obvious findings of acute cholecystitis. Consider further evaluation with   sonography if there are clinical findings of cholecystitis. 2. Bilateral perinephric stranding can be seen normally with age but could   also be related to underlying inflammation such as pyelonephritis. 3. A 1.1 cm nodular opacity in the base of the lingula could be related to   rounded atelectasis, scarring, and granuloma, or a neoplasm. Recommend   follow-up chest CT in 3 months as below unless there are prior outside   studies to establish stability. 4. Trace left pleural effusion and trace pericardial effusion.       RECOMMENDATIONS:   Incidental Pulmonary Nodules on CT      - Solid nodules, single, high risk      >8 mm:  Consider CT 3 months, PET/CT, or tissue sampling      Reference:  Mickey et al.  Guidelines for management of incidental   pulmonary nodules detected on CT images: From the [employer] 2017. Radiology 3194;915:493-604. Discharge Medications     Medication List        START taking these medications      aspirin EC 81 MG EC tablet  Take 1 tablet by mouth daily     ciprofloxacin 250 MG tablet  Commonly known as: CIPRO  Take 1 tablet by mouth 2 times daily for 5 days  Notes to patient: Ciprofloxacin (Cipro®)  Use: treat infections    Side effects: dizziness, nausea, diarrhea, or     headache.   Finish all this medication     K-Phos 500 MG tablet  Generic drug: potassium phosphate (monobasic)  Take 1 tablet by mouth 2 times daily for 10 days            CONTINUE taking these medications      CALCIUM 600+D3 PO     calcium carbonate 500 MG Tabs tablet  Commonly known as: OSCAL     dilTIAZem 120 MG extended release capsule  Commonly known as: CARDIZEM CD     esomeprazole 40 MG delayed release capsule  Commonly known as: NEXIUM     fluticasone 50 MCG/ACT nasal spray  Commonly known as: FLONASE     IRON 27 PO     metoprolol succinate 50 MG extended release tablet  Commonly known as: TOPROL XL     olopatadine 0.1 % ophthalmic solution  Commonly known as: PATANOL     PRESERVISION AREDS PO     Prolia 60 MG/ML Sosy SC injection  Generic drug: denosumab     RED YEAST RICE PO     Troy Oil-1000 200 MG Caps     SYSTANE OP     VITAMIN E PO            STOP taking these medications      ACETAMINOPHEN PO     ALPRAZolam 0.25 MG tablet  Commonly known as: XANAX     meloxicam 7.5 MG tablet  Commonly known as: MOBIC               Where to Get Your Medications        These medications were sent to [address]      Phone: [phone]   ciprofloxacin 250 MG tablet  K-Phos 500 MG tablet       Information about where to get these medications is not yet available    Ask your nurse or doctor about these medications  aspirin EC 81 MG EC tablet Discharge Condition/Location: Stable    Follow Up: Follow up with PCP and Pulmonary. More than 30 mts spent.     Maria Antonia Middleton MD 11/8/2022 12:47 PM

## 2022-11-08 NOTE — PROGRESS NOTES
Bedside report given to Azael Gordon RN  pt in stable condition no needs at this time.  Call light within reach

## 2022-11-08 NOTE — PROGRESS NOTES
Physician Progress Note      Lee Villanueva  [facility] #:                  [id number]  :                       1940  ADMIT DATE:       2022 10:51 AM  DISCH DATE:  RESPONDING  PROVIDER #:        Harpal Olivarez MD          QUERY TEXT:    Patient admitted with Pyelonephritis, noted to have urine culture with   >100,000 CFU/ml Escherichia coli. If possible, please document in progress   notes and discharge summary the acuity and associated organism of   pyelonephritis:    The medical record reflects the following:  Risk Factors: advanced age, gender  Clinical Indicators: Pyelonephritis bilateral per imaging but suspect left   sided with symptoms of left flank pain, ecoli on urine culture  Treatment: urine and blood cx, IV Rocephin    Thank you for your assistance,  Katalina Baez RN,BSN,CCDS,CRCR  Options provided:  -- Acute pyelonephritis due to ecoli  -- Other - I will add my own diagnosis  -- Disagree - Not applicable / Not valid  -- Disagree - Clinically unable to determine / Unknown  -- Refer to Clinical Documentation Reviewer    PROVIDER RESPONSE TEXT:    The patient with acute pyelonephritis due to ecoli.     Query created by: Gricel Ny on 2022 10:17 AM      Electronically signed by:  Harpal Olivarez MD 2022 7:02 PM

## 2022-11-08 NOTE — PROGRESS NOTES
P [facility]                                 Pulmonary Consult /Progress Note :                                                                Cc follow up  lung nodule     HISTORY OF PRESENT ILLNESS:     States she has no SOB   No cough  No chest pain   No hemoptysis  Never smoke or second hand smoking     PHYSICAL EXAM:  Blood pressure (!) 144/81, pulse 79, temperature 98.1 °F (36.7 °C), temperature source Oral, resp. rate 16, height 5' 3\" (1.6 m), weight 163 lb 9.6 oz (74.2 kg), SpO2 98 %.' on RA  Gen: No distress. Eyes: PERRL. No sclera icterus. No conjunctival injection. ENT: No discharge. Pharynx clear. Neck: Trachea midline. No obvious mass. Resp: No accessory muscle use. Few crackles. No wheezes. No rhonchi. No dullness on percussion. CV: Regular rate. Regular rhythm. No murmur or rub. No edema. GI: Non-tender. Non-distended. No hernia. Skin: Warm and dry. No nodule on exposed extremities. Lymph: No cervical LAD. No supraclavicular LAD. M/S: No cyanosis. No joint deformity. No clubbing. Neuro: Awake. Alert. Moves all four extremities. Psych: Oriented x 3. No anxiety. LABS:  CBC:   Recent Labs     11/06/22  0324 11/07/22  0600 11/08/22  0523   WBC 11.5* 10.6 8.9   HGB 8.6* 9.2* 9.0*   HCT 25.8* 26.7* 26.3*   MCV 94.4 94.1 93.3    290 317       BMP:   Recent Labs     11/06/22  0324 11/07/22  0600 11/08/22  0523   * 125* 132*   K 4.1 4.1 4.4  4.4    98* 102   CO2 20* 19* 19*   PHOS 1.4* 1.3* 1.5*   BUN 25* 16 14   CREATININE 1.3* 1.3* 1.1       LIVER PROFILE:   Recent Labs     11/05/22  1152   AST 15   ALT 17   LIPASE 27.0   BILITOT 0.4   ALKPHOS 114       PT/INR: No results for input(s): PROTIME, INR in the last 72 hours. APTT: No results for input(s): APTT in the last 72 hours.   UA:  Recent Labs     11/05/22  1152   COLORU Yellow   PHUR 5.5   WBCUA *   RBCUA 5-10* BACTERIA 3+*   CLARITYU SL CLOUDY*   SPECGRAV >=1.030   LEUKOCYTESUR MODERATE*   UROBILINOGEN 0.2   BILIRUBINUR SMALL*   BLOODU LARGE*   GLUCOSEU Negative       No results for input(s): PHART, ATT1CSX, PO2ART in the last 72 hours. CT abdomen 11/5/2022  images reviewed by me and showed:   A 1.1 cm nodular opacity in the base of the lingula could be related to   rounded atelectasis, scarring, and granuloma, or a neoplasm. Trace left pleural effusion and trace pericardial effusion      ASSESSMENT:  Lingula 1.1 cm nodule on CT abdomen 11/5/2022. DDx atelectasis, granuloma and malignancy   Small left pleural effusion with atelectasis -likely sympathetic  Pyelonephritis growing E.  Coli  Chronic cough and SOB better with Albuterol - probable asthma   Seasonal allergy   Life long nonsmoker     PLAN:  IV Abx per IM   Lung nodule vs rounded atelectasis ,repeat CT in 3 months ,given my contact to call fo follow up   Follow up as OP in 3  months with CT  Ambulated with no SOB   Saleem Valles MD,[facility]  Pulmonary&Critical Care Medicine   [facility]bi Tejeda ,and Critical care    [facility]

## 2022-11-09 LAB
BLOOD CULTURE, ROUTINE: NORMAL
CULTURE, BLOOD 2: NORMAL

## 2022-11-16 ENCOUNTER — TELEPHONE (OUTPATIENT)
Dept: CASE MANAGEMENT | Age: 82
End: 2022-11-16

## 2022-11-16 ENCOUNTER — HOSPITAL ENCOUNTER (OUTPATIENT)
Age: 82
End: 2022-11-16
Payer: MEDICARE

## 2022-11-16 DIAGNOSIS — Z95.0: ICD-10-CM

## 2022-11-16 DIAGNOSIS — I10: ICD-10-CM

## 2022-11-16 DIAGNOSIS — R94.31: ICD-10-CM

## 2022-11-16 DIAGNOSIS — I50.32: ICD-10-CM

## 2022-11-16 DIAGNOSIS — I25.10: ICD-10-CM

## 2022-11-16 DIAGNOSIS — R06.00: ICD-10-CM

## 2022-11-16 DIAGNOSIS — E78.49: ICD-10-CM

## 2022-11-16 PROCEDURE — 93306 TTE W/DOPPLER COMPLETE: CPT

## 2022-11-16 NOTE — TELEPHONE ENCOUNTER
Imaging report CT Chest 11/7/22 with f/u imaging recommendations sent to Loreta Brice MD    Strongly consider pulmonology referral for nodules greater than 6 mm.     [place]  Lung Navigation LAURY(HAWK)

## 2023-02-20 NOTE — CONSULTS
Impression: Vitreous degeneration, bilateral: H43.813. Plan: Reviewed the signs and symptoms of possible retinal detachment (flashes, floaters, change in peripheral vision, etc). Advised to contact us immediately for any of these or other new symptoms. Patient is being seen at the request of Daphine Paget, MD   for a consultation for lung nodule     HISTORY OF PRESENT ILLNESS:   Patient was admitted 11/5 being treated for pyelonephritis had CT abdomen 11/5/2022 lung imaging reviewed by me and showed small left lower lobe 1.1 cm nodule. Associated with small left effusion. Not sure what makes it better or worse. No prior history of lung nodule. Never smoked. History of skin cancer. No known lung disease. Uses Albuterol for SOB/cough as needed with help. She has seasonal allergy- sneezing cough runny nose and itchy eyes. PAST MEDICAL HISTORY:  Past Medical History:   Diagnosis Date    Atrial fibrillation ([facility] [address]     Cardiomyopathy ([facility] [address]     Hypertension      PAST SURGICAL HISTORY:  Past Surgical History:   Procedure Laterality Date    BACK SURGERY      CATARACT EXTRACTION, BILATERAL      COLON SURGERY      PACEMAKER INSERTION         FAMILY HISTORY:  No lung cancer     SOCIAL HISTORY:   reports that she has never smoked. She has never used smokeless tobacco.    Scheduled Meds:   dilTIAZem  120 mg Oral Daily    pantoprazole  40 mg Oral BID AC    metoprolol succinate  50 mg Oral Daily    ketotifen  1 drop Both Eyes BID    cefTRIAXone (ROCEPHIN) IV  1,000 mg IntraVENous Q24H    sodium chloride flush  5-40 mL IntraVENous 2 times per day    enoxaparin  30 mg SubCUTAneous Daily     Continuous Infusions:   sodium chloride       PRN Meds:  sodium chloride flush, sodium chloride, ondansetron **OR** ondansetron, polyethylene glycol, acetaminophen **OR** acetaminophen, morphine    ALLERGIES:  Patient is allergic to sulfa antibiotics.     REVIEW OF SYSTEMS:  Constitutional: Negative for fever  HENT: Negative for sore throat  Eyes: Negative for redness   Respiratory: + MOORE   Cardiovascular: Negative for chest pain  Gastrointestinal: Negative for vomiting, diarrhea   Genitourinary: Negative for hematuria   Musculoskeletal: Negative for arthralgias   Skin: Negative for rash  Neurological: Negative for syncope  Hematological: Negative for adenopathy  Psychiatric/Behavorial: Negative for anxiety    PHYSICAL EXAM:  Blood pressure (!) 150/79, pulse 89, temperature 98.1 °F (36.7 °C), temperature source Oral, resp. rate 14, height 5' 3\" (1.6 m), weight 150 lb (68 kg), SpO2 96 %.' on RA  Gen: No distress. Eyes: PERRL. No sclera icterus. No conjunctival injection. ENT: No discharge. Pharynx clear. Neck: Trachea midline. No obvious mass. Resp: No accessory muscle use. Few crackles. No wheezes. No rhonchi. No dullness on percussion. CV: Regular rate. Regular rhythm. No murmur or rub. No edema. GI: Non-tender. Non-distended. No hernia. Skin: Warm and dry. No nodule on exposed extremities. Lymph: No cervical LAD. No supraclavicular LAD. M/S: No cyanosis. No joint deformity. No clubbing. Neuro: Awake. Alert. Moves all four extremities. Psych: Oriented x 3. No anxiety. LABS:  CBC:   Recent Labs     11/05/22  1152 11/06/22  0324 11/07/22  0600   WBC 16.2* 11.5* 10.6   HGB 10.3* 8.6* 9.2*   HCT 30.8* 25.8* 26.7*   MCV 94.1 94.4 94.1    232 290     BMP:   Recent Labs     11/05/22  1152 11/06/22  0324 11/07/22  0600   * 130* 125*   K 4.0 4.1 4.1   CL 95* 101 98*   CO2 20* 20* 19*   PHOS  --  1.4* 1.3*   BUN 31* 25* 16   CREATININE 1.5* 1.3* 1.3*     LIVER PROFILE:   Recent Labs     11/05/22  1152   AST 15   ALT 17   LIPASE 27.0   BILITOT 0.4   ALKPHOS 114     PT/INR: No results for input(s): PROTIME, INR in the last 72 hours. APTT: No results for input(s): APTT in the last 72 hours. UA:  Recent Labs     11/05/22  1152   COLORU Yellow   PHUR 5.5   WBCUA *   RBCUA 5-10*   BACTERIA 3+*   CLARITYU SL CLOUDY*   SPECGRAV >=1.030   LEUKOCYTESUR MODERATE*   UROBILINOGEN 0.2   BILIRUBINUR SMALL*   BLOODU LARGE*   GLUCOSEU Negative     No results for input(s): PHART, NDV6PST, PO2ART in the last 72 hours.         CT abdomen 11/5/2022  images reviewed by me and showed:   A 1.1 cm nodular opacity in the base of the lingula could be related to   rounded atelectasis, scarring, and granuloma, or a neoplasm. Trace left pleural effusion and trace pericardial effusion      ASSESSMENT:  Lingula 1.1 cm nodule on CT abdomen 11/5/2022. DDx atelectasis, granuloma and malignancy   Small left pleural effusion with atelectasis -likely sympathetic  Pyelonephritis growing E.  Coli  Chronic cough and SOB better with Albuterol - probable asthma   Seasonal allergy   Life long nonsmoker     PLAN:  CT chest evaluate for adenopathy and other nodules  IV Abx per IM   Further evaluation will be based on CT chest results

## 2024-04-26 ENCOUNTER — HOSPITAL ENCOUNTER (OUTPATIENT)
Dept: HOSPITAL 22 - 2ND | Age: 84
Setting detail: OBSERVATION
LOS: 1 days | Discharge: HOME | End: 2024-04-27
Payer: MEDICARE

## 2024-04-26 VITALS
OXYGEN SATURATION: 97 % | RESPIRATION RATE: 18 BRPM | DIASTOLIC BLOOD PRESSURE: 86 MMHG | SYSTOLIC BLOOD PRESSURE: 151 MMHG | HEART RATE: 70 BPM

## 2024-04-26 VITALS
HEART RATE: 70 BPM | OXYGEN SATURATION: 97 % | SYSTOLIC BLOOD PRESSURE: 159 MMHG | DIASTOLIC BLOOD PRESSURE: 87 MMHG | RESPIRATION RATE: 14 BRPM

## 2024-04-26 VITALS
OXYGEN SATURATION: 97 % | HEART RATE: 70 BPM | SYSTOLIC BLOOD PRESSURE: 160 MMHG | DIASTOLIC BLOOD PRESSURE: 87 MMHG | RESPIRATION RATE: 18 BRPM

## 2024-04-26 VITALS
RESPIRATION RATE: 16 BRPM | TEMPERATURE: 98.1 F | DIASTOLIC BLOOD PRESSURE: 82 MMHG | OXYGEN SATURATION: 97 % | SYSTOLIC BLOOD PRESSURE: 155 MMHG | HEART RATE: 76 BPM

## 2024-04-26 VITALS
RESPIRATION RATE: 16 BRPM | OXYGEN SATURATION: 97 % | TEMPERATURE: 98.42 F | DIASTOLIC BLOOD PRESSURE: 67 MMHG | HEART RATE: 70 BPM | SYSTOLIC BLOOD PRESSURE: 122 MMHG

## 2024-04-26 VITALS
HEART RATE: 78 BPM | RESPIRATION RATE: 16 BRPM | DIASTOLIC BLOOD PRESSURE: 67 MMHG | OXYGEN SATURATION: 95 % | SYSTOLIC BLOOD PRESSURE: 155 MMHG

## 2024-04-26 VITALS
RESPIRATION RATE: 20 BRPM | SYSTOLIC BLOOD PRESSURE: 142 MMHG | DIASTOLIC BLOOD PRESSURE: 78 MMHG | HEART RATE: 70 BPM | OXYGEN SATURATION: 98 %

## 2024-04-26 VITALS
SYSTOLIC BLOOD PRESSURE: 133 MMHG | OXYGEN SATURATION: 98 % | HEART RATE: 74 BPM | RESPIRATION RATE: 16 BRPM | DIASTOLIC BLOOD PRESSURE: 74 MMHG

## 2024-04-26 VITALS
SYSTOLIC BLOOD PRESSURE: 121 MMHG | RESPIRATION RATE: 16 BRPM | TEMPERATURE: 97.9 F | DIASTOLIC BLOOD PRESSURE: 71 MMHG | HEART RATE: 76 BPM | OXYGEN SATURATION: 96 %

## 2024-04-26 VITALS
OXYGEN SATURATION: 99 % | DIASTOLIC BLOOD PRESSURE: 83 MMHG | SYSTOLIC BLOOD PRESSURE: 148 MMHG | TEMPERATURE: 98.1 F | RESPIRATION RATE: 14 BRPM | HEART RATE: 72 BPM

## 2024-04-26 VITALS
SYSTOLIC BLOOD PRESSURE: 136 MMHG | TEMPERATURE: 97.88 F | DIASTOLIC BLOOD PRESSURE: 67 MMHG | OXYGEN SATURATION: 98 % | RESPIRATION RATE: 16 BRPM | HEART RATE: 76 BPM

## 2024-04-26 VITALS
TEMPERATURE: 97.9 F | OXYGEN SATURATION: 98 % | DIASTOLIC BLOOD PRESSURE: 73 MMHG | HEART RATE: 69 BPM | RESPIRATION RATE: 17 BRPM | SYSTOLIC BLOOD PRESSURE: 127 MMHG

## 2024-04-26 VITALS — OXYGEN SATURATION: 97 %

## 2024-04-26 VITALS
SYSTOLIC BLOOD PRESSURE: 122 MMHG | HEART RATE: 76 BPM | OXYGEN SATURATION: 96 % | RESPIRATION RATE: 14 BRPM | DIASTOLIC BLOOD PRESSURE: 72 MMHG

## 2024-04-26 VITALS
RESPIRATION RATE: 17 BRPM | HEART RATE: 70 BPM | DIASTOLIC BLOOD PRESSURE: 90 MMHG | OXYGEN SATURATION: 97 % | SYSTOLIC BLOOD PRESSURE: 158 MMHG

## 2024-04-26 VITALS
DIASTOLIC BLOOD PRESSURE: 75 MMHG | SYSTOLIC BLOOD PRESSURE: 133 MMHG | RESPIRATION RATE: 17 BRPM | HEART RATE: 70 BPM | TEMPERATURE: 97.88 F | OXYGEN SATURATION: 99 %

## 2024-04-26 VITALS
OXYGEN SATURATION: 97 % | SYSTOLIC BLOOD PRESSURE: 156 MMHG | RESPIRATION RATE: 18 BRPM | HEART RATE: 70 BPM | DIASTOLIC BLOOD PRESSURE: 81 MMHG

## 2024-04-26 VITALS
DIASTOLIC BLOOD PRESSURE: 87 MMHG | OXYGEN SATURATION: 97 % | TEMPERATURE: 97.9 F | SYSTOLIC BLOOD PRESSURE: 163 MMHG | RESPIRATION RATE: 17 BRPM | HEART RATE: 70 BPM

## 2024-04-26 VITALS
HEART RATE: 76 BPM | OXYGEN SATURATION: 97 % | SYSTOLIC BLOOD PRESSURE: 122 MMHG | RESPIRATION RATE: 16 BRPM | DIASTOLIC BLOOD PRESSURE: 73 MMHG

## 2024-04-26 VITALS — DIASTOLIC BLOOD PRESSURE: 78 MMHG | OXYGEN SATURATION: 97 % | SYSTOLIC BLOOD PRESSURE: 146 MMHG | HEART RATE: 76 BPM

## 2024-04-26 VITALS — BODY MASS INDEX: 25.7 KG/M2 | BODY MASS INDEX: 25.4 KG/M2

## 2024-04-26 VITALS — HEART RATE: 70 BPM

## 2024-04-26 DIAGNOSIS — I25.118: ICD-10-CM

## 2024-04-26 DIAGNOSIS — N18.9: ICD-10-CM

## 2024-04-26 DIAGNOSIS — I13.0: ICD-10-CM

## 2024-04-26 DIAGNOSIS — I51.7: ICD-10-CM

## 2024-04-26 DIAGNOSIS — R06.09: ICD-10-CM

## 2024-04-26 DIAGNOSIS — E78.5: ICD-10-CM

## 2024-04-26 DIAGNOSIS — Z79.899: ICD-10-CM

## 2024-04-26 DIAGNOSIS — I50.32: ICD-10-CM

## 2024-04-26 DIAGNOSIS — R94.31: ICD-10-CM

## 2024-04-26 DIAGNOSIS — Z95.0: ICD-10-CM

## 2024-04-26 DIAGNOSIS — I27.9: ICD-10-CM

## 2024-04-26 LAB
ANION GAP SERPL CALC-SCNC: 12.2 MEQ/L (ref 5–15)
BUN SERPL-MCNC: 25 MG/DL (ref 7–17)
CALCIUM SPEC-MCNC: 9.9 MG/DL (ref 8.4–10.2)
CATHL ACTIVATED CLOTTING TIME: 260 SEC (ref 74–125)
CATHL ACTIVATED CLOTTING TIME: 313 SEC (ref 74–125)
CHLORIDE SPEC-SCNC: 104 MMOL/L (ref 98–107)
CO2 SERPL-SCNC: 27 MMOL/L (ref 22–30)
CREATININE CLEARANCE ESTIMATED: 30 ML/MIN (ref 50–200)
CREATININE,SERUM: 1.5 MG/DL (ref 0.52–1.04)
ESTIMATED GLOMERULAR FILT RATE: 33 ML/MIN (ref 60–?)
GFR (AFRICAN AMERICAN): 40 ML/MIN (ref 60–?)
GLUCOSE: 82 MG/DL (ref 74–100)
HCT VFR BLD CALC: 40.7 % (ref 37–47)
HGB BLD-MCNC: 13.3 G/DL (ref 12.2–16.2)
MCHC RBC-ENTMCNC: 32.8 G/DL (ref 31.8–35.4)
MCV RBC: 99.9 FL (ref 81–99)
MEAN CORPUSCULAR HEMOGLOBIN: 32.7 PG (ref 27–31.2)
PLATELET # BLD: 278 K/MM3 (ref 142–424)
POTASSIUM: 4.2 MMOL/L (ref 3.5–5.1)
RBC # BLD AUTO: 4.08 M/MM3 (ref 4.2–5.4)
SODIUM SPEC-SCNC: 139 MMOL/L (ref 136–145)
WBC # BLD AUTO: 7.2 K/MM3 (ref 4.8–10.8)

## 2024-04-26 PROCEDURE — C1769 GUIDE WIRE: HCPCS

## 2024-04-26 PROCEDURE — C1874 STENT, COATED/COV W/DEL SYS: HCPCS

## 2024-04-26 PROCEDURE — 36415 COLL VENOUS BLD VENIPUNCTURE: CPT

## 2024-04-26 PROCEDURE — G0378 HOSPITAL OBSERVATION PER HR: HCPCS

## 2024-04-26 PROCEDURE — 93458 L HRT ARTERY/VENTRICLE ANGIO: CPT

## 2024-04-26 PROCEDURE — 92928 PRQ TCAT PLMT NTRAC ST 1 LES: CPT

## 2024-04-26 PROCEDURE — C1876 STENT, NON-COA/NON-COV W/DEL: HCPCS

## 2024-04-26 PROCEDURE — 80048 BASIC METABOLIC PNL TOTAL CA: CPT

## 2024-04-26 PROCEDURE — C1725 CATH, TRANSLUMIN NON-LASER: HCPCS

## 2024-04-26 PROCEDURE — 99153 MOD SED SAME PHYS/QHP EA: CPT

## 2024-04-26 PROCEDURE — 85347 COAGULATION TIME ACTIVATED: CPT

## 2024-04-26 PROCEDURE — C9600 PERC DRUG-EL COR STENT SING: HCPCS

## 2024-04-26 PROCEDURE — 85025 COMPLETE CBC W/AUTO DIFF WBC: CPT

## 2024-04-26 PROCEDURE — 99152 MOD SED SAME PHYS/QHP 5/>YRS: CPT

## 2024-04-26 RX ADMIN — SODIUM CHLORIDE 25 ML: 900 INJECTION, SOLUTION INTRAVENOUS at 09:56

## 2024-04-26 RX ADMIN — PANTOPRAZOLE SODIUM 40 MG: 40 TABLET, DELAYED RELEASE ORAL at 21:58

## 2024-04-26 RX ADMIN — DILTIAZEM HYDROCHLORIDE 240 MG: 240 CAPSULE, COATED, EXTENDED RELEASE ORAL at 21:58

## 2024-04-26 RX ADMIN — METOPROLOL SUCCINATE 50 MG: 50 TABLET, EXTENDED RELEASE ORAL at 21:58

## 2024-04-26 RX ADMIN — HEPARIN SODIUM 10000 UNIT: 1000 INJECTION, SOLUTION INTRAVENOUS; SUBCUTANEOUS at 09:56

## 2024-04-26 RX ADMIN — ACETAMINOPHEN 1000 MG: 500 TABLET ORAL at 21:59

## 2024-04-26 RX ADMIN — MIDAZOLAM HYDROCHLORIDE 1 MG: 1 INJECTION, SOLUTION INTRAMUSCULAR; INTRAVENOUS at 10:38

## 2024-04-26 RX ADMIN — LIDOCAINE HYDROCHLORIDE 20 ML: 10 INJECTION, SOLUTION INFILTRATION; PERINEURAL at 09:56

## 2024-04-26 RX ADMIN — SODIUM CHLORIDE, SODIUM LACTATE, POTASSIUM CHLORIDE, AND CALCIUM CHLORIDE 125 ML: 600; 310; 30; 20 INJECTION, SOLUTION INTRAVENOUS at 16:06

## 2024-04-26 RX ADMIN — IOPAMIDOL 110 ML: 755 INJECTION, SOLUTION INTRAVENOUS at 11:13

## 2024-04-26 RX ADMIN — HEPARIN SODIUM 3000 UNIT: 200 INJECTION, SOLUTION INTRAVENOUS at 09:56

## 2024-04-26 RX ADMIN — FENTANYL CITRATE 50 MCG: 50 INJECTION, SOLUTION INTRAMUSCULAR; INTRAVENOUS at 10:37

## 2024-04-27 VITALS
HEART RATE: 70 BPM | TEMPERATURE: 97.88 F | SYSTOLIC BLOOD PRESSURE: 117 MMHG | OXYGEN SATURATION: 96 % | RESPIRATION RATE: 16 BRPM | DIASTOLIC BLOOD PRESSURE: 62 MMHG

## 2024-04-27 VITALS
SYSTOLIC BLOOD PRESSURE: 144 MMHG | DIASTOLIC BLOOD PRESSURE: 70 MMHG | RESPIRATION RATE: 16 BRPM | OXYGEN SATURATION: 96 % | HEART RATE: 80 BPM | TEMPERATURE: 97.9 F

## 2024-04-27 VITALS — HEART RATE: 70 BPM

## 2024-04-27 LAB
ANION GAP SERPL CALC-SCNC: 4.4 MEQ/L (ref 5–15)
BUN SERPL-MCNC: 19 MG/DL (ref 7–17)
CALCIUM SPEC-MCNC: 9 MG/DL (ref 8.4–10.2)
CHLORIDE SPEC-SCNC: 109 MMOL/L (ref 98–107)
CO2 SERPL-SCNC: 28 MMOL/L (ref 22–30)
CREATININE CLEARANCE ESTIMATED: 35 ML/MIN (ref 50–200)
CREATININE,SERUM: 1.3 MG/DL (ref 0.52–1.04)
ESTIMATED GLOMERULAR FILT RATE: 39 ML/MIN (ref 60–?)
GFR (AFRICAN AMERICAN): 47 ML/MIN (ref 60–?)
GLUCOSE: 89 MG/DL (ref 74–100)
HCT VFR BLD CALC: 34.4 % (ref 37–47)
HGB BLD-MCNC: 11.4 G/DL (ref 12.2–16.2)
MCHC RBC-ENTMCNC: 33 G/DL (ref 31.8–35.4)
MCV RBC: 97.5 FL (ref 81–99)
MEAN CORPUSCULAR HEMOGLOBIN: 32.2 PG (ref 27–31.2)
PLATELET # BLD: 239 K/MM3 (ref 142–424)
POTASSIUM: 4.4 MMOL/L (ref 3.5–5.1)
RBC # BLD AUTO: 3.53 M/MM3 (ref 4.2–5.4)
SODIUM SPEC-SCNC: 137 MMOL/L (ref 136–145)
WBC # BLD AUTO: 5.4 K/MM3 (ref 4.8–10.8)

## 2024-04-27 RX ADMIN — ASPIRIN 81 MG: 81 TABLET, DELAYED RELEASE ORAL at 08:06

## 2024-04-27 RX ADMIN — CLOPIDOGREL BISULFATE 75 MG: 75 TABLET ORAL at 08:06

## 2024-04-27 RX ADMIN — METOPROLOL SUCCINATE 50 MG: 50 TABLET, EXTENDED RELEASE ORAL at 08:06

## 2024-04-27 RX ADMIN — DILTIAZEM HYDROCHLORIDE 240 MG: 240 CAPSULE, COATED, EXTENDED RELEASE ORAL at 08:07

## 2024-04-27 RX ADMIN — PANTOPRAZOLE SODIUM 40 MG: 40 TABLET, DELAYED RELEASE ORAL at 08:07

## 2024-08-28 ENCOUNTER — HOSPITAL ENCOUNTER (OUTPATIENT)
Age: 84
Discharge: HOME | End: 2024-08-28
Payer: MEDICARE

## 2024-08-28 DIAGNOSIS — I50.32: ICD-10-CM

## 2024-08-28 DIAGNOSIS — I25.118: ICD-10-CM

## 2024-08-28 DIAGNOSIS — Z95.0: ICD-10-CM

## 2024-08-28 DIAGNOSIS — R06.09: ICD-10-CM

## 2024-08-28 DIAGNOSIS — I73.9: Primary | ICD-10-CM

## 2024-08-28 DIAGNOSIS — I11.0: ICD-10-CM

## 2024-08-28 DIAGNOSIS — R94.31: ICD-10-CM

## 2024-08-28 DIAGNOSIS — I27.9: ICD-10-CM

## 2024-08-28 LAB — NT PRO BRAIN NATRIURETIC PEP.: 371 PG/ML (ref 0–450)

## 2024-08-28 PROCEDURE — 93923 UPR/LXTR ART STDY 3+ LVLS: CPT

## 2024-08-28 PROCEDURE — 83880 ASSAY OF NATRIURETIC PEPTIDE: CPT

## 2024-08-28 PROCEDURE — 36415 COLL VENOUS BLD VENIPUNCTURE: CPT

## 2024-08-29 ENCOUNTER — HOSPITAL ENCOUNTER (OUTPATIENT)
Age: 84
Discharge: HOME | End: 2024-08-29
Payer: MEDICARE

## 2024-08-29 VITALS
HEART RATE: 70 BPM | OXYGEN SATURATION: 96 % | SYSTOLIC BLOOD PRESSURE: 134 MMHG | DIASTOLIC BLOOD PRESSURE: 70 MMHG | RESPIRATION RATE: 16 BRPM

## 2024-08-29 VITALS
OXYGEN SATURATION: 97 % | DIASTOLIC BLOOD PRESSURE: 70 MMHG | SYSTOLIC BLOOD PRESSURE: 110 MMHG | HEART RATE: 70 BPM | RESPIRATION RATE: 16 BRPM

## 2024-08-29 VITALS
DIASTOLIC BLOOD PRESSURE: 67 MMHG | OXYGEN SATURATION: 97 % | SYSTOLIC BLOOD PRESSURE: 123 MMHG | RESPIRATION RATE: 17 BRPM | HEART RATE: 70 BPM

## 2024-08-29 VITALS
HEART RATE: 70 BPM | RESPIRATION RATE: 18 BRPM | DIASTOLIC BLOOD PRESSURE: 62 MMHG | SYSTOLIC BLOOD PRESSURE: 123 MMHG | OXYGEN SATURATION: 96 %

## 2024-08-29 VITALS
DIASTOLIC BLOOD PRESSURE: 67 MMHG | OXYGEN SATURATION: 97 % | RESPIRATION RATE: 15 BRPM | SYSTOLIC BLOOD PRESSURE: 116 MMHG | HEART RATE: 70 BPM

## 2024-08-29 VITALS
SYSTOLIC BLOOD PRESSURE: 95 MMHG | HEART RATE: 70 BPM | RESPIRATION RATE: 17 BRPM | OXYGEN SATURATION: 95 % | DIASTOLIC BLOOD PRESSURE: 55 MMHG

## 2024-08-29 VITALS
HEART RATE: 70 BPM | OXYGEN SATURATION: 97 % | SYSTOLIC BLOOD PRESSURE: 115 MMHG | DIASTOLIC BLOOD PRESSURE: 64 MMHG | RESPIRATION RATE: 17 BRPM

## 2024-08-29 VITALS
TEMPERATURE: 96.98 F | SYSTOLIC BLOOD PRESSURE: 136 MMHG | RESPIRATION RATE: 20 BRPM | OXYGEN SATURATION: 100 % | HEART RATE: 70 BPM | DIASTOLIC BLOOD PRESSURE: 72 MMHG

## 2024-08-29 VITALS — BODY MASS INDEX: 24.9 KG/M2

## 2024-08-29 VITALS
OXYGEN SATURATION: 95 % | RESPIRATION RATE: 19 BRPM | HEART RATE: 81 BPM | SYSTOLIC BLOOD PRESSURE: 119 MMHG | DIASTOLIC BLOOD PRESSURE: 63 MMHG

## 2024-08-29 VITALS
HEART RATE: 70 BPM | OXYGEN SATURATION: 96 % | DIASTOLIC BLOOD PRESSURE: 63 MMHG | RESPIRATION RATE: 18 BRPM | SYSTOLIC BLOOD PRESSURE: 116 MMHG

## 2024-08-29 VITALS
DIASTOLIC BLOOD PRESSURE: 66 MMHG | OXYGEN SATURATION: 96 % | HEART RATE: 70 BPM | RESPIRATION RATE: 16 BRPM | SYSTOLIC BLOOD PRESSURE: 129 MMHG

## 2024-08-29 VITALS
SYSTOLIC BLOOD PRESSURE: 127 MMHG | HEART RATE: 70 BPM | DIASTOLIC BLOOD PRESSURE: 76 MMHG | OXYGEN SATURATION: 95 % | RESPIRATION RATE: 17 BRPM

## 2024-08-29 DIAGNOSIS — R06.09: ICD-10-CM

## 2024-08-29 DIAGNOSIS — Z95.0: ICD-10-CM

## 2024-08-29 DIAGNOSIS — I50.32: ICD-10-CM

## 2024-08-29 DIAGNOSIS — I73.9: ICD-10-CM

## 2024-08-29 DIAGNOSIS — I25.118: Primary | ICD-10-CM

## 2024-08-29 DIAGNOSIS — I11.0: ICD-10-CM

## 2024-08-29 DIAGNOSIS — R94.31: ICD-10-CM

## 2024-08-29 DIAGNOSIS — I27.9: ICD-10-CM

## 2024-08-29 LAB
ANION GAP SERPL CALC-SCNC: 12.1 MEQ/L (ref 5–15)
BUN SERPL-MCNC: 24 MG/DL (ref 7–17)
CALCIUM SPEC-MCNC: 9.2 MG/DL (ref 8.4–10.2)
CATHL ACTIVATED CLOTTING TIME: 261 SEC (ref 74–125)
CHLORIDE SPEC-SCNC: 104 MMOL/L (ref 98–107)
CO2 SERPL-SCNC: 23 MMOL/L (ref 22–30)
CREAT BLD-SCNC: 1.3 MG/DL (ref 0.52–1.04)
CREATININE CLEARANCE ESTIMATED: 34 ML/MIN (ref 50–200)
ESTIMATED GLOMERULAR FILT RATE: 39 ML/MIN (ref 60–?)
GFR (AFRICAN AMERICAN): 47 ML/MIN (ref 60–?)
GLUCOSE: 86 MG/DL (ref 74–100)
HCT VFR BLD CALC: 36.3 % (ref 37–47)
HGB BLD-MCNC: 11.4 G/DL (ref 12.2–16.2)
MCHC RBC-ENTMCNC: 31.4 G/DL (ref 31.8–35.4)
MCV RBC: 105.5 FL (ref 81–99)
MEAN CORPUSCULAR HEMOGLOBIN: 33.1 PG (ref 27–31.2)
PLATELET # BLD: 333 K/MM3 (ref 142–424)
POTASSIUM: 4.1 MMOL/L (ref 3.5–5.1)
RBC # BLD AUTO: 3.44 M/MM3 (ref 4.2–5.4)
SODIUM SPEC-SCNC: 135 MMOL/L (ref 136–145)
WBC # BLD AUTO: 6.2 K/MM3 (ref 4.8–10.8)

## 2024-08-29 PROCEDURE — C1760 CLOSURE DEV, VASC: HCPCS

## 2024-08-29 PROCEDURE — 85025 COMPLETE CBC W/AUTO DIFF WBC: CPT

## 2024-08-29 PROCEDURE — 99152 MOD SED SAME PHYS/QHP 5/>YRS: CPT

## 2024-08-29 PROCEDURE — C1769 GUIDE WIRE: HCPCS

## 2024-08-29 PROCEDURE — 85347 COAGULATION TIME ACTIVATED: CPT

## 2024-08-29 PROCEDURE — C1874 STENT, COATED/COV W/DEL SYS: HCPCS

## 2024-08-29 PROCEDURE — 92928 PRQ TCAT PLMT NTRAC ST 1 LES: CPT

## 2024-08-29 PROCEDURE — 99153 MOD SED SAME PHYS/QHP EA: CPT

## 2024-08-29 PROCEDURE — 93458 L HRT ARTERY/VENTRICLE ANGIO: CPT

## 2024-08-29 PROCEDURE — C9600 PERC DRUG-EL COR STENT SING: HCPCS

## 2024-08-29 PROCEDURE — C1725 CATH, TRANSLUMIN NON-LASER: HCPCS

## 2024-08-29 PROCEDURE — 80048 BASIC METABOLIC PNL TOTAL CA: CPT

## 2024-09-30 ENCOUNTER — HOSPITAL ENCOUNTER (OUTPATIENT)
Age: 84
Discharge: HOME | End: 2024-09-30
Payer: MEDICARE

## 2024-09-30 VITALS
DIASTOLIC BLOOD PRESSURE: 67 MMHG | OXYGEN SATURATION: 94 % | SYSTOLIC BLOOD PRESSURE: 129 MMHG | RESPIRATION RATE: 18 BRPM | HEART RATE: 70 BPM

## 2024-09-30 VITALS
SYSTOLIC BLOOD PRESSURE: 156 MMHG | DIASTOLIC BLOOD PRESSURE: 85 MMHG | RESPIRATION RATE: 18 BRPM | OXYGEN SATURATION: 97 % | HEART RATE: 70 BPM

## 2024-09-30 VITALS
RESPIRATION RATE: 18 BRPM | DIASTOLIC BLOOD PRESSURE: 87 MMHG | SYSTOLIC BLOOD PRESSURE: 134 MMHG | HEART RATE: 70 BPM | OXYGEN SATURATION: 97 %

## 2024-09-30 VITALS
HEART RATE: 70 BPM | RESPIRATION RATE: 18 BRPM | OXYGEN SATURATION: 96 % | DIASTOLIC BLOOD PRESSURE: 90 MMHG | SYSTOLIC BLOOD PRESSURE: 144 MMHG

## 2024-09-30 VITALS
OXYGEN SATURATION: 94 % | RESPIRATION RATE: 16 BRPM | DIASTOLIC BLOOD PRESSURE: 68 MMHG | SYSTOLIC BLOOD PRESSURE: 132 MMHG | HEART RATE: 71 BPM

## 2024-09-30 VITALS
HEART RATE: 70 BPM | SYSTOLIC BLOOD PRESSURE: 157 MMHG | RESPIRATION RATE: 18 BRPM | OXYGEN SATURATION: 97 % | DIASTOLIC BLOOD PRESSURE: 86 MMHG

## 2024-09-30 VITALS
RESPIRATION RATE: 16 BRPM | HEART RATE: 70 BPM | DIASTOLIC BLOOD PRESSURE: 107 MMHG | OXYGEN SATURATION: 97 % | SYSTOLIC BLOOD PRESSURE: 198 MMHG

## 2024-09-30 VITALS
RESPIRATION RATE: 18 BRPM | OXYGEN SATURATION: 97 % | DIASTOLIC BLOOD PRESSURE: 77 MMHG | HEART RATE: 70 BPM | SYSTOLIC BLOOD PRESSURE: 147 MMHG

## 2024-09-30 VITALS
DIASTOLIC BLOOD PRESSURE: 78 MMHG | SYSTOLIC BLOOD PRESSURE: 143 MMHG | RESPIRATION RATE: 18 BRPM | HEART RATE: 70 BPM | OXYGEN SATURATION: 95 %

## 2024-09-30 VITALS
HEART RATE: 70 BPM | OXYGEN SATURATION: 98 % | RESPIRATION RATE: 16 BRPM | DIASTOLIC BLOOD PRESSURE: 93 MMHG | SYSTOLIC BLOOD PRESSURE: 170 MMHG

## 2024-09-30 VITALS — BODY MASS INDEX: 24.9 KG/M2

## 2024-09-30 DIAGNOSIS — I50.32: ICD-10-CM

## 2024-09-30 DIAGNOSIS — I25.118: Primary | ICD-10-CM

## 2024-09-30 DIAGNOSIS — Z79.899: ICD-10-CM

## 2024-09-30 DIAGNOSIS — I77.1: ICD-10-CM

## 2024-09-30 DIAGNOSIS — I73.9: ICD-10-CM

## 2024-09-30 DIAGNOSIS — I11.0: ICD-10-CM

## 2024-09-30 DIAGNOSIS — E78.5: ICD-10-CM

## 2024-09-30 LAB
ALBUMIN LEVEL: 4 G/DL (ref 3.5–5)
ALP ISO SERPL-ACNC: 56 U/L (ref 38–126)
ALT SERPLBLD-CCNC: 25 U/L (ref 12–78)
ANION GAP SERPL CALC-SCNC: 10.6 MEQ/L (ref 5–15)
AST SERPL QL: 30 U/L (ref 14–36)
BILIRUB DIRECT SERPL-MCNC: 0.1 MG/DL (ref 0–0.4)
BILIRUB INDIRECT SERPL-MCNC: 0.3 MG/DL (ref 0–0.9)
BILIRUB INDIRECT SERPL-MCNC: 0.3 MG/DL (ref 0–1.1)
BILIRUBIN,TOTAL: 0.4 MG/DL (ref 0.2–1.3)
BUN SERPL-MCNC: 20 MG/DL (ref 7–17)
CALCIUM SPEC-MCNC: 9.5 MG/DL (ref 8.4–10.2)
CATHL ACTIVATED CLOTTING TIME: 258 SEC (ref 74–125)
CHLORIDE SPEC-SCNC: 99 MMOL/L (ref 98–107)
CHOLEST SPEC-SCNC: 275 MG/DL (ref 140–200)
CO2 SERPL-SCNC: 27 MMOL/L (ref 22–30)
CREAT BLD-SCNC: 1.4 MG/DL (ref 0.52–1.04)
CREATININE CLEARANCE ESTIMATED: 31 ML/MIN (ref 50–200)
ESTIMATED GLOMERULAR FILT RATE: 36 ML/MIN (ref 60–?)
GFR (AFRICAN AMERICAN): 43 ML/MIN (ref 60–?)
GLUCOSE: 91 MG/DL (ref 74–100)
HCT VFR BLD CALC: 35.5 % (ref 37–47)
HDLC SERPL-MCNC: 44 MG/DL (ref 40–60)
HGB BLD-MCNC: 11.5 G/DL (ref 12.2–16.2)
MCHC RBC-ENTMCNC: 32.5 G/DL (ref 31.8–35.4)
MCV RBC: 103.3 FL (ref 81–99)
MEAN CORPUSCULAR HEMOGLOBIN: 33.5 PG (ref 27–31.2)
PLATELET # BLD: 308 K/MM3 (ref 142–424)
POTASSIUM: 4.6 MMOL/L (ref 3.5–5.1)
PROT SERPL-MCNC: 7 G/DL (ref 6.3–8.2)
RBC # BLD AUTO: 3.43 M/MM3 (ref 4.2–5.4)
SODIUM SPEC-SCNC: 132 MMOL/L (ref 136–145)
TRIGLYCERIDES: 211 MG/DL (ref 30–150)
WBC # BLD AUTO: 5.4 K/MM3 (ref 4.8–10.8)

## 2024-09-30 PROCEDURE — 99152 MOD SED SAME PHYS/QHP 5/>YRS: CPT

## 2024-09-30 PROCEDURE — 80061 LIPID PANEL: CPT

## 2024-09-30 PROCEDURE — 37224: CPT

## 2024-09-30 PROCEDURE — C1725 CATH, TRANSLUMIN NON-LASER: HCPCS

## 2024-09-30 PROCEDURE — 36415 COLL VENOUS BLD VENIPUNCTURE: CPT

## 2024-09-30 PROCEDURE — 85347 COAGULATION TIME ACTIVATED: CPT

## 2024-09-30 PROCEDURE — 85025 COMPLETE CBC W/AUTO DIFF WBC: CPT

## 2024-09-30 PROCEDURE — C1769 GUIDE WIRE: HCPCS

## 2024-09-30 PROCEDURE — 80076 HEPATIC FUNCTION PANEL: CPT

## 2024-09-30 PROCEDURE — 99153 MOD SED SAME PHYS/QHP EA: CPT

## 2024-09-30 PROCEDURE — 80048 BASIC METABOLIC PNL TOTAL CA: CPT

## 2024-10-23 ENCOUNTER — HOSPITAL ENCOUNTER (OUTPATIENT)
Age: 84
Discharge: HOME | End: 2024-10-23
Payer: MEDICARE

## 2024-10-23 VITALS
DIASTOLIC BLOOD PRESSURE: 101 MMHG | SYSTOLIC BLOOD PRESSURE: 135 MMHG | RESPIRATION RATE: 14 BRPM | HEART RATE: 70 BPM | OXYGEN SATURATION: 97 %

## 2024-10-23 VITALS
DIASTOLIC BLOOD PRESSURE: 96 MMHG | RESPIRATION RATE: 14 BRPM | SYSTOLIC BLOOD PRESSURE: 138 MMHG | TEMPERATURE: 98.06 F | OXYGEN SATURATION: 99 % | HEART RATE: 73 BPM

## 2024-10-23 VITALS
HEART RATE: 69 BPM | RESPIRATION RATE: 16 BRPM | OXYGEN SATURATION: 96 % | DIASTOLIC BLOOD PRESSURE: 90 MMHG | SYSTOLIC BLOOD PRESSURE: 130 MMHG

## 2024-10-23 VITALS
RESPIRATION RATE: 16 BRPM | OXYGEN SATURATION: 97 % | DIASTOLIC BLOOD PRESSURE: 110 MMHG | SYSTOLIC BLOOD PRESSURE: 166 MMHG | HEART RATE: 78 BPM

## 2024-10-23 VITALS
RESPIRATION RATE: 16 BRPM | SYSTOLIC BLOOD PRESSURE: 135 MMHG | DIASTOLIC BLOOD PRESSURE: 103 MMHG | OXYGEN SATURATION: 96 % | HEART RATE: 72 BPM

## 2024-10-23 VITALS
OXYGEN SATURATION: 100 % | HEART RATE: 70 BPM | RESPIRATION RATE: 18 BRPM | SYSTOLIC BLOOD PRESSURE: 226 MMHG | DIASTOLIC BLOOD PRESSURE: 99 MMHG | TEMPERATURE: 98.4 F

## 2024-10-23 VITALS
RESPIRATION RATE: 16 BRPM | DIASTOLIC BLOOD PRESSURE: 99 MMHG | HEART RATE: 70 BPM | OXYGEN SATURATION: 96 % | SYSTOLIC BLOOD PRESSURE: 141 MMHG

## 2024-10-23 VITALS
SYSTOLIC BLOOD PRESSURE: 141 MMHG | OXYGEN SATURATION: 96 % | HEART RATE: 70 BPM | DIASTOLIC BLOOD PRESSURE: 98 MMHG | RESPIRATION RATE: 16 BRPM

## 2024-10-23 VITALS
SYSTOLIC BLOOD PRESSURE: 136 MMHG | DIASTOLIC BLOOD PRESSURE: 95 MMHG | HEART RATE: 70 BPM | OXYGEN SATURATION: 96 % | RESPIRATION RATE: 16 BRPM

## 2024-10-23 VITALS
DIASTOLIC BLOOD PRESSURE: 93 MMHG | OXYGEN SATURATION: 97 % | HEART RATE: 74 BPM | SYSTOLIC BLOOD PRESSURE: 133 MMHG | RESPIRATION RATE: 16 BRPM

## 2024-10-23 VITALS — SYSTOLIC BLOOD PRESSURE: 138 MMHG | DIASTOLIC BLOOD PRESSURE: 96 MMHG

## 2024-10-23 VITALS — HEART RATE: 90 BPM

## 2024-10-23 VITALS — DIASTOLIC BLOOD PRESSURE: 89 MMHG | SYSTOLIC BLOOD PRESSURE: 201 MMHG

## 2024-10-23 VITALS — BODY MASS INDEX: 25.8 KG/M2

## 2024-10-23 DIAGNOSIS — I25.118: ICD-10-CM

## 2024-10-23 DIAGNOSIS — I77.1: ICD-10-CM

## 2024-10-23 DIAGNOSIS — I48.0: ICD-10-CM

## 2024-10-23 DIAGNOSIS — I11.0: ICD-10-CM

## 2024-10-23 DIAGNOSIS — Z95.0: ICD-10-CM

## 2024-10-23 DIAGNOSIS — Z79.899: ICD-10-CM

## 2024-10-23 DIAGNOSIS — I70.212: Primary | ICD-10-CM

## 2024-10-23 DIAGNOSIS — I50.32: ICD-10-CM

## 2024-10-23 DIAGNOSIS — Z79.02: ICD-10-CM

## 2024-10-23 LAB
ANION GAP SERPL CALC-SCNC: 14.7 MEQ/L (ref 5–15)
BUN SERPL-MCNC: 25 MG/DL (ref 7–17)
CALCIUM SPEC-MCNC: 9.3 MG/DL (ref 8.4–10.2)
CATHL ACTIVATED CLOTTING TIME: 243 SEC (ref 74–125)
CHLORIDE SPEC-SCNC: 101 MMOL/L (ref 98–107)
CO2 SERPL-SCNC: 22 MMOL/L (ref 22–30)
CREAT BLD-SCNC: 1.2 MG/DL (ref 0.52–1.04)
CREATININE CLEARANCE ESTIMATED: 36 ML/MIN (ref 50–200)
ESTIMATED GLOMERULAR FILT RATE: 43 ML/MIN (ref 60–?)
GFR (AFRICAN AMERICAN): 52 ML/MIN (ref 60–?)
GLUCOSE: 87 MG/DL (ref 74–100)
HCT VFR BLD CALC: 34 % (ref 37–47)
HGB BLD-MCNC: 11.4 G/DL (ref 12.2–16.2)
MCHC RBC-ENTMCNC: 33.7 G/DL (ref 31.8–35.4)
MCV RBC: 99.5 FL (ref 81–99)
MEAN CORPUSCULAR HEMOGLOBIN: 33.5 PG (ref 27–31.2)
PLATELET # BLD: 291 K/MM3 (ref 142–424)
POTASSIUM: 4.7 MMOL/L (ref 3.5–5.1)
RBC # BLD AUTO: 3.41 M/MM3 (ref 4.2–5.4)
SODIUM SPEC-SCNC: 133 MMOL/L (ref 136–145)
WBC # BLD AUTO: 4.9 K/MM3 (ref 4.8–10.8)

## 2024-10-23 PROCEDURE — C1894 INTRO/SHEATH, NON-LASER: HCPCS

## 2024-10-23 PROCEDURE — C9764 REVASC INTRAVASC LITHOTRIPSY: HCPCS

## 2024-10-23 PROCEDURE — C1766 INTRO/SHEATH,STRBLE,NON-PEEL: HCPCS

## 2024-10-23 PROCEDURE — 99152 MOD SED SAME PHYS/QHP 5/>YRS: CPT

## 2024-10-23 PROCEDURE — C1769 GUIDE WIRE: HCPCS

## 2024-10-23 PROCEDURE — C1760 CLOSURE DEV, VASC: HCPCS

## 2024-10-23 PROCEDURE — 85347 COAGULATION TIME ACTIVATED: CPT

## 2024-10-23 PROCEDURE — C2623 CATH, TRANSLUMIN, DRUG-COAT: HCPCS

## 2024-10-23 PROCEDURE — C1725 CATH, TRANSLUMIN NON-LASER: HCPCS

## 2024-10-23 PROCEDURE — 99153 MOD SED SAME PHYS/QHP EA: CPT

## 2024-10-23 PROCEDURE — 80048 BASIC METABOLIC PNL TOTAL CA: CPT

## 2024-10-23 PROCEDURE — 85025 COMPLETE CBC W/AUTO DIFF WBC: CPT

## 2025-04-21 ENCOUNTER — HOSPITAL ENCOUNTER (OUTPATIENT)
Dept: HOSPITAL 22 - RT | Age: 85
Discharge: HOME | End: 2025-04-21
Payer: MEDICARE

## 2025-04-21 DIAGNOSIS — M79.89: Primary | ICD-10-CM

## 2025-04-21 PROCEDURE — 93971 EXTREMITY STUDY: CPT
